# Patient Record
Sex: FEMALE | Race: BLACK OR AFRICAN AMERICAN | NOT HISPANIC OR LATINO | Employment: FULL TIME | URBAN - METROPOLITAN AREA
[De-identification: names, ages, dates, MRNs, and addresses within clinical notes are randomized per-mention and may not be internally consistent; named-entity substitution may affect disease eponyms.]

---

## 2017-02-09 ENCOUNTER — OFFICE VISIT (OUTPATIENT)
Dept: OBSTETRICS AND GYNECOLOGY | Facility: CLINIC | Age: 42
End: 2017-02-09
Payer: MEDICAID

## 2017-02-09 ENCOUNTER — LAB VISIT (OUTPATIENT)
Dept: LAB | Facility: HOSPITAL | Age: 42
End: 2017-02-09
Attending: OBSTETRICS & GYNECOLOGY
Payer: MEDICAID

## 2017-02-09 VITALS
WEIGHT: 147.06 LBS | BODY MASS INDEX: 23.64 KG/M2 | DIASTOLIC BLOOD PRESSURE: 70 MMHG | SYSTOLIC BLOOD PRESSURE: 110 MMHG | HEIGHT: 66 IN

## 2017-02-09 DIAGNOSIS — N92.6 IRREGULAR MENSES: Primary | ICD-10-CM

## 2017-02-09 DIAGNOSIS — N92.6 IRREGULAR MENSES: ICD-10-CM

## 2017-02-09 LAB
BASOPHILS # BLD AUTO: 0.03 K/UL
BASOPHILS NFR BLD: 0.4 %
DIFFERENTIAL METHOD: NORMAL
EOSINOPHIL # BLD AUTO: 0.1 K/UL
EOSINOPHIL NFR BLD: 1.2 %
ERYTHROCYTE [DISTWIDTH] IN BLOOD BY AUTOMATED COUNT: 12.9 %
HCT VFR BLD AUTO: 38.7 %
HGB BLD-MCNC: 12.5 G/DL
LYMPHOCYTES # BLD AUTO: 1.8 K/UL
LYMPHOCYTES NFR BLD: 25.7 %
MCH RBC QN AUTO: 28.9 PG
MCHC RBC AUTO-ENTMCNC: 32.3 %
MCV RBC AUTO: 89 FL
MONOCYTES # BLD AUTO: 0.4 K/UL
MONOCYTES NFR BLD: 6.1 %
NEUTROPHILS # BLD AUTO: 4.5 K/UL
NEUTROPHILS NFR BLD: 66.5 %
PLATELET # BLD AUTO: 301 K/UL
PMV BLD AUTO: 10.4 FL
RBC # BLD AUTO: 4.33 M/UL
TSH SERPL DL<=0.005 MIU/L-ACNC: 1.22 UIU/ML
WBC # BLD AUTO: 6.84 K/UL

## 2017-02-09 PROCEDURE — 84443 ASSAY THYROID STIM HORMONE: CPT

## 2017-02-09 PROCEDURE — 36415 COLL VENOUS BLD VENIPUNCTURE: CPT

## 2017-02-09 PROCEDURE — 99999 PR PBB SHADOW E&M-EST. PATIENT-LVL II: CPT | Mod: PBBFAC,,, | Performed by: OBSTETRICS & GYNECOLOGY

## 2017-02-09 PROCEDURE — 99204 OFFICE O/P NEW MOD 45 MIN: CPT | Mod: S$PBB,,, | Performed by: OBSTETRICS & GYNECOLOGY

## 2017-02-09 PROCEDURE — 85025 COMPLETE CBC W/AUTO DIFF WBC: CPT

## 2017-02-09 NOTE — PROGRESS NOTES
Subjective:       Patient ID: Monte Lashone Weathers is a 41 y.o. female.    Chief Complaint:  Menstrual Problem (irregular bleeding)      History of Present Illness  HPI  Pt complains of having 2 periods per month.  Pt has been having menses every 21-24 days and periods last 4-5 days.  Denies excessive bleeding or cramping with menses.  Periods have followed this pattern since menarche.  Pt is not really bothered by this, but was told it was not normal and wanted to have it evaluated.  Pt is not sexually active and is not using any contraceptive treatment.  Last pap NILM in 2016.    GYN & OB History  Patient's last menstrual period was 2017.   Date of Last Pap: No result found    OB History    Para Term  AB SAB TAB Ectopic Multiple Living   5 2   3   1  2      # Outcome Date GA Lbr Jose David/2nd Weight Sex Delivery Anes PTL Lv   5 AB            4 AB            3 Ectopic            2 Para            1 Para                   Review of Systems  Review of Systems   Constitutional: Negative for activity change, appetite change, fatigue, fever and unexpected weight change.   Respiratory: Negative for shortness of breath.    Cardiovascular: Negative for chest pain, palpitations and leg swelling.   Gastrointestinal: Negative for abdominal pain, constipation, diarrhea, nausea and vomiting.   Genitourinary: Negative for frequency, genital sores, hematuria, menorrhagia, menstrual problem, pelvic pain, vaginal bleeding, vaginal discharge, vaginal pain, dysmenorrhea and vaginal odor.   Musculoskeletal: Negative for back pain.   Neurological: Negative for syncope and headaches.           Objective:    Physical Exam:   Constitutional: She is oriented to person, place, and time. She appears well-developed and well-nourished. No distress.       Cardiovascular: Normal rate, regular rhythm and normal heart sounds.     Pulmonary/Chest: Effort normal and breath sounds normal.        Abdominal: Soft. Bowel sounds are  normal. She exhibits no distension. There is no tenderness.     Genitourinary: Vagina normal and uterus normal. Pelvic exam was performed with patient supine. There is no rash, tenderness, lesion or injury on the right labia. There is no rash, tenderness, lesion or injury on the left labia. Uterus is not deviated, not enlarged and not tender. Cervix is normal. Right adnexum displays no mass, no tenderness and no fullness. Left adnexum displays no mass, no tenderness and no fullness. No erythema, tenderness or bleeding in the vagina. No foreign body in the vagina. No signs of injury around the vagina. No vaginal discharge found. Cervix exhibits no motion tenderness, no discharge and no friability.           Musculoskeletal: Normal range of motion and moves all extremeties. She exhibits no edema or tenderness.       Neurological: She is alert and oriented to person, place, and time.    Skin: Skin is warm and dry.    Psychiatric: She has a normal mood and affect. Her behavior is normal. Thought content normal.          Assessment:        1. Irregular menses             Plan:      Irregular menses  -     CBC auto differential; Future; Expected date: 2/9/17  -     TSH; Future; Expected date: 2/9/17  -     Overall cycle patterns falls within normal range, however, periods are occurring every 21-24 days.  Pt was counseled on treatment options.  Pt voiced understanding and prefers to proceed with expectant management.      Return if symptoms worsen or fail to improve.

## 2017-02-10 ENCOUNTER — TELEPHONE (OUTPATIENT)
Dept: OBSTETRICS AND GYNECOLOGY | Facility: CLINIC | Age: 42
End: 2017-02-10

## 2017-02-10 NOTE — TELEPHONE ENCOUNTER
----- Message from Travis Rdz MD sent at 2/10/2017  1:14 PM CST -----  Please contact pt to inform that test results are within normal range.  Keep scheduled appt.

## 2017-02-16 ENCOUNTER — TELEPHONE (OUTPATIENT)
Dept: OBSTETRICS AND GYNECOLOGY | Facility: CLINIC | Age: 42
End: 2017-02-16

## 2017-02-16 NOTE — TELEPHONE ENCOUNTER
----- Message from Keith Reed sent at 2/16/2017 12:52 PM CST -----  Contact: Pt   States she is calling rg missing a phone call and is calling to see if it were 's nurse that called and can be reached at [phone]//[email or web address]/dbw

## 2017-02-17 NOTE — TELEPHONE ENCOUNTER
----- Message from Velma Javed sent at 2/17/2017  1:29 PM CST -----  Patient returning nurse call. Please adv/call [phone].//thanks. cw

## 2017-02-21 ENCOUNTER — TELEPHONE (OUTPATIENT)
Dept: OBSTETRICS AND GYNECOLOGY | Facility: CLINIC | Age: 42
End: 2017-02-21

## 2017-02-21 NOTE — TELEPHONE ENCOUNTER
Patient given normal test results and encouraged to keep next appointment.  Patient verbalized understanding.

## 2017-03-29 ENCOUNTER — OFFICE VISIT (OUTPATIENT)
Dept: OBSTETRICS AND GYNECOLOGY | Facility: CLINIC | Age: 42
End: 2017-03-29
Payer: MEDICAID

## 2017-03-29 VITALS
HEIGHT: 66 IN | RESPIRATION RATE: 15 BRPM | BODY MASS INDEX: 23.13 KG/M2 | WEIGHT: 143.94 LBS | SYSTOLIC BLOOD PRESSURE: 112 MMHG | DIASTOLIC BLOOD PRESSURE: 74 MMHG

## 2017-03-29 DIAGNOSIS — N63.25 BREAST LUMP ON LEFT SIDE AT 12 O'CLOCK POSITION: Primary | ICD-10-CM

## 2017-03-29 DIAGNOSIS — N64.4 BREAST PAIN, LEFT: ICD-10-CM

## 2017-03-29 PROCEDURE — 99213 OFFICE O/P EST LOW 20 MIN: CPT | Mod: PBBFAC | Performed by: OBSTETRICS & GYNECOLOGY

## 2017-03-29 PROCEDURE — 99214 OFFICE O/P EST MOD 30 MIN: CPT | Mod: S$PBB,,, | Performed by: OBSTETRICS & GYNECOLOGY

## 2017-03-29 PROCEDURE — 99999 PR PBB SHADOW E&M-EST. PATIENT-LVL III: CPT | Mod: PBBFAC,,, | Performed by: OBSTETRICS & GYNECOLOGY

## 2017-03-29 RX ORDER — PANTOPRAZOLE SODIUM 40 MG/1
40 TABLET, DELAYED RELEASE ORAL DAILY
COMMUNITY
End: 2017-06-05

## 2017-03-29 NOTE — MR AVS SNAPSHOT
"    [facility]  [address]  [place] [address]  Phone: [phone]  Fax: [phone]                  Monte Lashone Weathers   3/29/2017 3:15 PM   Office Visit    Description:  Female : 1975   Provider:  Lyudmila Galvez CNM   Department:  [facility]           Reason for Visit     Breast Pain           Diagnoses this Visit        Comments    Breast lump on left side at 12 o'clock position    -  Primary     Breast pain, left                To Do List           Future Appointments        Provider Department Dept Phone    2017 9:45 AM [facility] US2 [facility] [phone]    2017 10:30 AM [facility] MAMMO2-DX [facility] [phone]    2017 1:30 PM [facility]/ -[phone]      Goals (5 Years of Data)     None      [facility]     [facility] Nurse Care Line -  Assistance  Registered nurses in the [facility] provide clinical advisement, health education, appointment booking, and other advisory services.  Call for this free service at [phone].             Medications                Verify that the below list of medications is an accurate representation of the medications you are currently taking.  If none reported, the list may be blank. If incorrect, please contact your healthcare provider. Carry this list with you in case of emergency.           Current Medications     pantoprazole (PROTONIX) 40 MG tablet Take 40 mg by mouth once daily.           Clinical Reference Information           Your Vitals Were     BP Resp Height Weight Last Period BMI    112/74 (BP Location: Right arm, Patient Position: Sitting, BP Method: Manual) 15 5' 6" (1.676 m) 65.3 kg (143 lb 15.4 oz) 2017 23.24 kg/m2      Blood Pressure          Most Recent Value    BP  112/74      Allergies as of 3/29/2017     No Known Allergies      Immunizations Administered on Date of Encounter - 3/29/2017     None    "   Orders Placed During Today's Visit     Future Labs/Procedures Expected by Expires    Mammo Digital Diagnostic Bilateral With CAD  3/29/2017 5/29/2018    US Breast Left Complete  3/29/2017 5/29/2018      [facility] Sign-Up     Activating your [facility] account is as easy as 1-2-3!     1) Visit [email or web address], select Sign Up Now, enter this activation code and your date of birth, then select Next.  [email or web address]  Expires: 5/13/2017  3:39 PM      2) Create a username and password to use when you visit [facility] in the future and select a security question in case you lose your password and select Next.    3) Enter your e-mail address and click Sign Up!    Additional Information  If you have questions, please e-mail [email or web address] or call [phone] to talk to our [facility] staff. Remember, [facility] is NOT to be used for urgent needs. For medical emergencies, dial 911.         Language Assistance Services     ATTENTION: Language assistance services are available, free of charge. Please call [phone].      ATENCIÓN: Si habla español, tiene a rojas disposición servicios gratuitos de asistencia lingüística. Llame al [phone].     CHÚ Ý: N?u b?n nói Ti?ng Vi?t, có các d?ch v? h? tr? ngôn ng? mi?n phí dành cho b?n. G?i s? [phone].         [facility] complies with applicable Federal civil rights laws and does not discriminate on the basis of race, color, national origin, age, disability, or sex.

## 2017-03-30 NOTE — PROGRESS NOTES
Subjective:       Patient ID: Monte Lashone Weathers is a 41 y.o. female.    Chief Complaint:  Breast Pain (started in Dec 2016, stinging pain )    Patient's last menstrual period was 2017.     History of Present Illness  Patient here with complaints of left breast pain, stinging rated 8/10 starting around /January ever since buying new bras, still wearing new bras. Rec patient stop wearing bra that are causing her pain. Also reports palpable lump in left breast.    OB History    Para Term  AB SAB TAB Ectopic Multiple Living   5 2   3   1  2      # Outcome Date GA Lbr Jose David/2nd Weight Sex Delivery Anes PTL Lv   5 AB            4 AB            3 Ectopic            2 Para            1 Para                 Review of Systems  Review of Systems   Constitutional: Negative for chills and fever.   HENT: Negative for facial swelling.    Eyes: Negative for visual disturbance.   Respiratory: Negative for cough and shortness of breath.         Left breast pain and palpable mass   Cardiovascular: Negative for chest pain and palpitations.   Gastrointestinal: Negative for abdominal pain, diarrhea, nausea and vomiting.   Genitourinary: Negative for decreased urine volume, difficulty urinating, dysuria, frequency, vaginal bleeding, vaginal discharge and vaginal pain.   Neurological: Negative for dizziness, seizures, weakness and headaches.     Objective:    Physical Exam   Constitutional: She is oriented to person, place, and time. She appears well-developed and well-nourished. No distress. She is not intubated.   HENT:   Head: Normocephalic and atraumatic.   Neck: Normal range of motion. Neck supple. No tracheal deviation present. No thyromegaly present.   Pulmonary/Chest: Effort normal and breath sounds normal. No accessory muscle usage. No apnea, no tachypnea and no bradypnea. She is not intubated. No respiratory distress. Right breast exhibits no inverted nipple, no mass, no nipple discharge, no skin  change and no tenderness. Left breast exhibits mass. Left breast exhibits no inverted nipple, no nipple discharge, no skin change and no tenderness. Breasts are symmetrical. There is no breast swelling.       Abdominal: Soft.   Genitourinary: No breast tenderness, discharge or bleeding.   Musculoskeletal: Normal range of motion. She exhibits no edema, tenderness or deformity.   Neurological: She is alert and oriented to person, place, and time. She exhibits normal muscle tone. Coordination normal.   Skin: Skin is warm and dry. No rash noted. She is not diaphoretic. No erythema. No pallor.   Psychiatric: She has a normal mood and affect. Her behavior is normal. Judgment and thought content normal.   Nursing note and vitals reviewed.     Assessment:     1. Breast lump on left side at 12 o'clock position    2. Breast pain, left        Plan:   Monte Lashone was seen today for breast pain.    Diagnoses and all orders for this visit:    Breast lump on left side at 12 o'clock position  -     US Breast Left Complete; Future  -     Mammo Digital Diagnostic Bilateral With CAD; Future    Breast pain, left  -     US Breast Left Complete; Future  -     Mammo Digital Diagnostic Bilateral With CAD; Future    Follow up with Gen Surgery/Paty De La O NP after ultrasound and mammogram

## 2017-04-04 ENCOUNTER — TELEPHONE (OUTPATIENT)
Dept: RADIOLOGY | Facility: HOSPITAL | Age: 42
End: 2017-04-04

## 2017-04-05 ENCOUNTER — HOSPITAL ENCOUNTER (OUTPATIENT)
Dept: RADIOLOGY | Facility: HOSPITAL | Age: 42
Discharge: HOME | End: 2017-04-05
Attending: OBSTETRICS & GYNECOLOGY
Payer: MEDICAID

## 2017-04-05 ENCOUNTER — TELEPHONE (OUTPATIENT)
Dept: OBSTETRICS AND GYNECOLOGY | Facility: HOSPITAL | Age: 42
End: 2017-04-05

## 2017-04-05 DIAGNOSIS — N63.25 BREAST LUMP ON LEFT SIDE AT 12 O'CLOCK POSITION: ICD-10-CM

## 2017-04-05 DIAGNOSIS — N64.4 BREAST PAIN, LEFT: ICD-10-CM

## 2017-04-05 PROCEDURE — 76642 ULTRASOUND BREAST LIMITED: CPT | Mod: TC,PO,LT

## 2017-04-05 PROCEDURE — 77066 DX MAMMO INCL CAD BI: CPT | Mod: 26,,, | Performed by: RADIOLOGY

## 2017-04-05 PROCEDURE — 76642 ULTRASOUND BREAST LIMITED: CPT | Mod: 26,LT,, | Performed by: RADIOLOGY

## 2017-04-05 PROCEDURE — 77062 BREAST TOMOSYNTHESIS BI: CPT | Mod: TC

## 2017-04-05 PROCEDURE — 77062 BREAST TOMOSYNTHESIS BI: CPT | Mod: 26,,, | Performed by: RADIOLOGY

## 2017-04-05 NOTE — TELEPHONE ENCOUNTER
Spoke with patient regarding breast ultrasound and mammogram results. Patient v/u of follow up appt with Gen Surg on 4/10/17 @ 8 am

## 2017-04-06 ENCOUNTER — DOCUMENTATION ONLY (OUTPATIENT)
Dept: RADIOLOGY | Facility: HOSPITAL | Age: 42
End: 2017-04-06

## 2017-04-06 NOTE — PROGRESS NOTES
Breast Care Management Follow-Up:    Date of Mammogram:04/05/17    Mammogram Reason:Left breast mass    Mammogram Results:and Left U/S - Finding 1 - solid mass in the left breast in area of reported palpable abnormality. Finding 2 - solid mass in the left breast at 7:00. Finding 3 - suspicious calcifications in the left breast. Cat 4.      Referrals/Recommendations:Surgery consult        Patient Status:04/06/17 Results and rec given to pt by Lyudmila Galvez CNM. Pt has an appt with Paty De La O NP on 4/10/17. Results letter and report mailed to pt.

## 2017-04-07 ENCOUNTER — TELEPHONE (OUTPATIENT)
Dept: HEMATOLOGY/ONCOLOGY | Facility: CLINIC | Age: 42
End: 2017-04-07

## 2017-04-07 NOTE — TELEPHONE ENCOUNTER
Nurse returned pt called she was confused about her visit on Monday with NP, nurse explained to pt she will have a full assessment by Andrew De La O NP and she will inform her of the location for her breast bx, and instruction and pre-op will be reviewed at visit, pt was pleased with call and information provided to her, no other questions or concerns at time call ended.

## 2017-04-07 NOTE — TELEPHONE ENCOUNTER
----- Message from Marty Slade sent at 4/7/2017  8:49 AM CDT -----  Contact: Patient  Please call pt back @ ..[phone] (home). She has questions concerning her biopsy procedure. Thank you/NH

## 2017-04-11 ENCOUNTER — OFFICE VISIT (OUTPATIENT)
Dept: HEMATOLOGY/ONCOLOGY | Facility: CLINIC | Age: 42
End: 2017-04-11
Payer: MEDICAID

## 2017-04-11 VITALS
DIASTOLIC BLOOD PRESSURE: 70 MMHG | HEART RATE: 65 BPM | TEMPERATURE: 98 F | HEIGHT: 66 IN | WEIGHT: 144.81 LBS | SYSTOLIC BLOOD PRESSURE: 120 MMHG | RESPIRATION RATE: 18 BRPM | BODY MASS INDEX: 23.27 KG/M2 | OXYGEN SATURATION: 99 %

## 2017-04-11 DIAGNOSIS — R92.1 BREAST CALCIFICATIONS ON MAMMOGRAM: ICD-10-CM

## 2017-04-11 DIAGNOSIS — R92.8 ABNORMAL MAMMOGRAM: Primary | ICD-10-CM

## 2017-04-11 PROCEDURE — 99999 PR PBB SHADOW E&M-EST. PATIENT-LVL III: CPT | Mod: PBBFAC,,, | Performed by: NURSE PRACTITIONER

## 2017-04-11 PROCEDURE — 99213 OFFICE O/P EST LOW 20 MIN: CPT | Mod: PBBFAC,PO | Performed by: NURSE PRACTITIONER

## 2017-04-11 PROCEDURE — 99204 OFFICE O/P NEW MOD 45 MIN: CPT | Mod: S$PBB,,, | Performed by: NURSE PRACTITIONER

## 2017-04-11 NOTE — PATIENT INSTRUCTIONS
No anti-inflammatory medications 7 days prior to biopsy: Aleve, Aspirin, Ibuprofen and prescription anti-inflammatory medications.    May eat breakfast and take medications on day of procedure.    After procedure use ice pack over site intermittently and a good support bra for 24 hours.    No vigorous activity for 48 hours after biopsy that would cause extreme breast movement.     Return to clinic in one week for pathology results.

## 2017-04-11 NOTE — PROGRESS NOTES
Patient ID: Monte Lashone Weathers is a 41 y.o. female.    Chief Complaint: Abnormal Mammogram    HPI: Patient presents for the evaluation of a palpable left breast mass and abnormal mammogram of the left breast.    Review of Systems   Constitutional: Negative.    HENT: Positive for congestion and postnasal drip.    Eyes: Negative.    Respiratory: Negative.    Cardiovascular: Negative.    Gastrointestinal: Negative.    Endocrine: Negative.    Genitourinary: Negative.    Musculoskeletal: Negative.    Skin: Negative.    Allergic/Immunologic: Negative.    Neurological: Negative.    Hematological: Negative.    Psychiatric/Behavioral: Negative.    Breast: Pt relates having a sharp pain sensation that was intermittent about 2 months ago after she started wearing different bras. 2 weeks ago noted a small nodule in the left breast at the 12 oclock position. Denies any change in the area since noting. Pain sensation has decreased with a change in her bra. Nodule not tender to touch. No nipple discharge. No prior breast problems or trauma.     Current Outpatient Prescriptions   Medication Sig Dispense Refill    pantoprazole (PROTONIX) 40 MG tablet Take 40 mg by mouth once daily.       No current facility-administered medications for this visit.        Review of patient's allergies indicates:  No Known Allergies    No past medical history on file.    Past Surgical History:   Procedure Laterality Date    etopic pregnancy         Family History   Problem Relation Age of Onset    Stroke Father     Heart disease Mother        Social History     Social History    Marital status: Single     Spouse name: N/A    Number of children: N/A    Years of education: N/A     Occupational History    Not on file.     Social History Main Topics    Smoking status: Never Smoker    Smokeless tobacco: Never Used    Alcohol use No    Drug use: No    Sexual activity: No     Other Topics Concern    Not on file     Social History Narrative        There were no vitals filed for this visit.    Physical Exam   Constitutional: She is oriented to person, place, and time. She appears well-developed and well-nourished.   HENT:   Head: Normocephalic and atraumatic.   Right Ear: External ear normal.   Left Ear: External ear normal.   Eyes: Conjunctivae and EOM are normal. Pupils are equal, round, and reactive to light. Right eye exhibits no discharge. Left eye exhibits no discharge. No scleral icterus.   Neck: Normal range of motion. Neck supple. No thyromegaly present.   Cardiovascular: Normal rate and regular rhythm.    Pulmonary/Chest: Effort normal and breath sounds normal. Right breast exhibits no inverted nipple, no mass, no nipple discharge, no skin change and no tenderness. Left breast exhibits mass. Left breast exhibits no inverted nipple, no nipple discharge, no skin change and no tenderness.       Unable to appreciate the solid nodule at the 7 oclock position on exam.   Musculoskeletal: Normal range of motion.   Lymphadenopathy:        Head (right side): No submental, no submandibular, no tonsillar, no preauricular, no posterior auricular and no occipital adenopathy present.        Head (left side): No submental, no submandibular, no tonsillar, no preauricular, no posterior auricular and no occipital adenopathy present.     She has no cervical adenopathy.        Right cervical: No superficial cervical and no posterior cervical adenopathy present.       Left cervical: No superficial cervical and no posterior cervical adenopathy present.     She has no axillary adenopathy.        Right: No supraclavicular adenopathy present.        Left: No supraclavicular adenopathy present.   Neurological: She is alert and oriented to person, place, and time.   Skin: Skin is warm.   Psychiatric: She has a normal mood and affect. Her behavior is normal. Judgment and thought content normal.   Vitals reviewed.    Menarche at 16 y/o  [id number] and Ab2  First full  term pregnancy at 16 y/o  No hormone use  No radiation exposure  LMP: 4/5/17    FH: negative for breast cancer    IMAGING:  MAMMO DIGITAL DIAGNOSTIC BILAT WITH TOMOSYNTHESIS_CAD  Views: bilateral mediolateral oblique with tomosynthesis; bilateral  craniocaudal with tomosynthesis; left craniocaudal magnification; left  craniocaudal spot compression; left mediolateral magnification; left  mediolateral with tomosynthesis; left mediolateral oblique spot  compression; left mediolateral oblique magnification; left mediolateral  spot compression    The breasts are heterogeneously dense.  This may lower the sensitivity of  mammography.    Finding 1:  There is a round mass measuring 6 millimeters with  circumscribed margins seen in the left breast at 12 o'clock, sub-areolar.  Findings corresponds to a clinically reported palpable lump.    Finding 2:  There is an oval mass measuring 9 millimeters with  circumscribed margins seen in the anterior region of the left breast at 7  o'clock.    Finding 3:  There are punctate calcifications with grouped distribution  seen in the upper outer quadrant of the left breast.  Digital tomosynthesis was performed and used in the interpretation of the  images.  Images were evaluated with a Computer Aided Detection (CAD) system.    LEFT LIMITED ULTRASOUND FINDINGS:  Finding 1:  On ultrasound, the area in question is hypoechoic with  posterior acoustic enhancement and measures 8 x 4 x 5 mm. Ultrasound is  suggestive of a solid mass.    Finding 2:  On ultrasound, the area in question is hypoechoic with  posterior acoustic enhancement and measures 9 x 4 x 8 mm. Ultrasound is  suggestive of a solid mass.  Impression  Finding 1:  Solid mass in the left breast at 12 o'clock, sub-areolar is  probably benign. Follow-up in 6 months is recommended.    Finding 2:  Solid mass in the anterior region of the left breast at 7  o'clock is probably benign. Follow-up in 6 months is recommended.    Finding 3:   Calcifications in the left breast are suspicious. Histology  using core biopsy is recommended.      Assessment & Plan:  1. Palpable mass left breast 12 oclock  2. Solid nodule corresponds to the palpable mass at the 12 oclock left breast and another solid nodule was noted at the 7 oclock position left breast.  3. Microcalcifications noted in the left breast upper outer quadrant that appear suspicious.   4. Clinical exam verified presence of palpable nodule 12 oclock left breast- unable to palpate the 7 oclock nodule noted on ultrasound.  5. Explained the clinical and imaging findings to the pt and the significance.   6. Recommend stereotactic core biopsy of the left breast calcificaitons and either core biopsy of the 2 solid nodules if the radiologist recommends vs 6 mon f/u of the solid nodules.   7. Risk of the core biopsies reviewed with pt- risk of bleeding, infection, bruising, pain and the need for further surgical intervention if the path returns suspicious or cancer. Pt verbalized understanding and agrees with the recommendations.   8. Pre, claudia and postop instructions were give verbally and in writing.

## 2017-04-21 ENCOUNTER — DOCUMENTATION ONLY (OUTPATIENT)
Dept: SURGERY | Facility: CLINIC | Age: 42
End: 2017-04-21

## 2017-04-21 NOTE — PROGRESS NOTES
Notified pt of core biopsy results from [facility] performed on 4/18/17.   Left breast masses at 12 oclock and at 7 oclock and calcifications in the upper outer quadrant were biopsied.   Benign findings- see scanned report in media section under outside pathology.     Pt denies any bruising or redness at the biopsy sites. Tenderness noted. No fever.     Encouraged to do monthly bse and to call if notes any changes in the palpable nodule at the 12 oclock position. Would recommend we do a clinical exam at 3 mons to feliciano the palpable abnormality at 12 and imaging of the left breast- diagnostic mammo and ultrasound in 6 mons with clinical exam that follows. Pt verbalized understanding and agrees with recommendations.

## 2017-06-05 ENCOUNTER — OFFICE VISIT (OUTPATIENT)
Dept: OBSTETRICS AND GYNECOLOGY | Facility: CLINIC | Age: 42
End: 2017-06-05
Payer: MEDICAID

## 2017-06-05 VITALS
WEIGHT: 144.19 LBS | SYSTOLIC BLOOD PRESSURE: 112 MMHG | BODY MASS INDEX: 23.17 KG/M2 | DIASTOLIC BLOOD PRESSURE: 62 MMHG | HEIGHT: 66 IN

## 2017-06-05 DIAGNOSIS — N92.1 METRORRHAGIA: ICD-10-CM

## 2017-06-05 DIAGNOSIS — Z01.419 WELL WOMAN EXAM WITH ROUTINE GYNECOLOGICAL EXAM: Primary | ICD-10-CM

## 2017-06-05 PROCEDURE — 99999 PR PBB SHADOW E&M-EST. PATIENT-LVL III: CPT | Mod: PBBFAC,,, | Performed by: OBSTETRICS & GYNECOLOGY

## 2017-06-05 PROCEDURE — 99396 PREV VISIT EST AGE 40-64: CPT | Mod: S$PBB,,, | Performed by: OBSTETRICS & GYNECOLOGY

## 2017-06-05 PROCEDURE — 99213 OFFICE O/P EST LOW 20 MIN: CPT | Mod: PBBFAC | Performed by: OBSTETRICS & GYNECOLOGY

## 2017-06-05 PROCEDURE — 88175 CYTOPATH C/V AUTO FLUID REDO: CPT

## 2017-06-05 NOTE — PROGRESS NOTES
CHIEF COMPLAINT:   Gynecologic Exam  No chief complaint on file.      HISTORY OF PRESENT ILLNESS  Patient presents for annual exam. The patient has c/o irreg menses for past 6y. q 15-24d roughly as molly [place] but pt feels there is more irregularity than when she was younger.   She is not currenlty sexually active.  Contraception:  none       Patient's last menstrual period was 05/29/2017 (exact date).    GYN screening history: last Pap: was normal.      Health Maintenance   Topic Date Due    Lipid Panel  1975    TETANUS VACCINE  11/10/1994    Pap Smear with HPV Cotest  11/10/1997    Influenza Vaccine  08/01/2017    Mammogram  04/24/2019       HISTORY  There is no problem list on file for this patient.      History reviewed. No pertinent past medical history.    Past Surgical History:   Procedure Laterality Date    etopic pregnancy         Family History   Problem Relation Age of Onset    Stroke Father     Heart disease Mother        Social History     Social History    Marital status: Single     Spouse name: N/A    Number of children: N/A    Years of education: N/A     Social History Main Topics    Smoking status: Never Smoker    Smokeless tobacco: Never Used    Alcohol use No    Drug use: No    Sexual activity: No     Other Topics Concern    None     Social History Narrative    None       No current outpatient prescriptions on file.     No current facility-administered medications for this visit.        Review of patient's allergies indicates:  No Known Allergies        PHYSICAL EXAM     Vitals:    06/05/17 1351   BP: 112/62       PAIN SCALE: 0/10 None    ROS:  GENERAL: No fever, chills, fatigability or weight loss.  ABDOMEN: Appetite fine. No weight loss. Denies diarrhea, abdominal pain, hematemesis or blood in stool.  No change in bowel movement pattern.  URINARY: No flank pain, dysuria or hematuria.  REPRODUCTIVE: No abnormal vaginal bleeding.  BREASTS: Breasts symmetric,  nontender and no lumps detected.    PE:   APPEARANCE: Well nourished, well developed, in no acute distress.  NECK: Neck symmetric without masses or thyromegaly.     NODES: No inguinal lymph node enlargement.  ABDOMEN: Soft. No tenderness or masses. No hepatosplenomegaly. No hernias.  BREASTS: Symmetrical, no skin changes or visible lesions. No palpable masses, nipple discharge or adenopathy bilaterally.    PELVIC:   VULVA: No lesions. Normal female genitalia.  URETHRAL MEATUS: Normal size and location, no lesions, no prolapse.  URETHRA: No masses, tenderness, prolapse or scarring.  VAGINA: Moist and well rugated, no discharge, no significant cystocele or rectocele.  CERVIX: No lesions, normal diameter, no stenosis, no cervical motion tenderness.  UTERUS: 8 week size, regular shape, mobile, non-tender, normal position, good support.  ADNEXA: No masses, tenderness or CDS nodularity.  ANUS PERINEUM: No lesions, no relaxation, external hemorrhoids noted.        DIAGNOSIS:   1. Normal gyn exam  2. Screening pap smear  3. metrorrhagia    PLAN:   Mammogram   U/s pelvic    MEDICATIONS PRESCRIBED:        COUNSELING:  Patient was counseled today on A.C.S. Pap guidelines and recommendations for yearly pelvic exams, mammograms and monthly self breast exams; to see her PCP for other health maintenance.     FOLLOW-UP: With me for u/s review- possible EMB

## 2017-06-16 ENCOUNTER — TELEPHONE (OUTPATIENT)
Dept: RADIOLOGY | Facility: HOSPITAL | Age: 42
End: 2017-06-16

## 2017-06-19 ENCOUNTER — HOSPITAL ENCOUNTER (OUTPATIENT)
Dept: RADIOLOGY | Facility: HOSPITAL | Age: 42
Discharge: HOME | End: 2017-06-19
Attending: OBSTETRICS & GYNECOLOGY
Payer: MEDICAID

## 2017-06-19 ENCOUNTER — TELEPHONE (OUTPATIENT)
Dept: OBSTETRICS AND GYNECOLOGY | Facility: CLINIC | Age: 42
End: 2017-06-19

## 2017-06-19 DIAGNOSIS — N92.1 METRORRHAGIA: ICD-10-CM

## 2017-06-19 PROCEDURE — 76856 US EXAM PELVIC COMPLETE: CPT | Mod: 26,,, | Performed by: RADIOLOGY

## 2017-06-19 PROCEDURE — 76856 US EXAM PELVIC COMPLETE: CPT | Mod: TC

## 2017-06-19 PROCEDURE — 76830 TRANSVAGINAL US NON-OB: CPT | Mod: 26,,, | Performed by: RADIOLOGY

## 2017-06-19 NOTE — TELEPHONE ENCOUNTER
----- Message from Keyla Johns sent at 6/19/2017  1:27 PM CDT -----  Contact: pt  States she's on her cycle and she would like to see when the next available appt would be. Please call pt at [phone]. Thank you

## 2017-06-19 NOTE — TELEPHONE ENCOUNTER
Spoke with pt regarding ultrasound appointment that is for today. Pt stated that she was concerned about doing the ultrasound while she has been spotting. Pt was advised that it was fine to still make her appointment on today.

## 2017-12-22 ENCOUNTER — TELEPHONE (OUTPATIENT)
Dept: OBSTETRICS AND GYNECOLOGY | Facility: CLINIC | Age: 42
End: 2017-12-22

## 2017-12-22 NOTE — TELEPHONE ENCOUNTER
Spoke with pt and informed her that our first available would be 1/4/18. Pt declined the appointment and stated she'd go to an urgent care.

## 2017-12-22 NOTE — TELEPHONE ENCOUNTER
----- Message from Sangeetha Renner sent at 12/22/2017  9:37 AM CST -----  Patient is a established Medicaid patient.  She is having burning and itching in her vaginal area and wants to know if you could work her in today.   Call her at [phone].                                 landa

## 2018-04-02 ENCOUNTER — TELEPHONE (OUTPATIENT)
Dept: OBSTETRICS AND GYNECOLOGY | Facility: CLINIC | Age: 43
End: 2018-04-02

## 2018-04-02 NOTE — TELEPHONE ENCOUNTER
Spoke with pt. Scheduled her an appt per her request. Appt is scheduled for 4/5/18 at 9:00 am with Monalisa Torrez NP.

## 2018-08-08 ENCOUNTER — OFFICE VISIT (OUTPATIENT)
Dept: URGENT CARE | Facility: CLINIC | Age: 43
End: 2018-08-08
Payer: MEDICAID

## 2018-08-08 ENCOUNTER — HOSPITAL ENCOUNTER (OUTPATIENT)
Dept: RADIOLOGY | Facility: HOSPITAL | Age: 43
Discharge: HOME | End: 2018-08-08
Attending: NURSE PRACTITIONER
Payer: MEDICAID

## 2018-08-08 VITALS
HEIGHT: 67 IN | OXYGEN SATURATION: 100 % | SYSTOLIC BLOOD PRESSURE: 100 MMHG | HEART RATE: 87 BPM | BODY MASS INDEX: 22.15 KG/M2 | WEIGHT: 141.13 LBS | DIASTOLIC BLOOD PRESSURE: 60 MMHG | TEMPERATURE: 97 F | RESPIRATION RATE: 16 BRPM

## 2018-08-08 DIAGNOSIS — M79.89 NODULE OF SOFT TISSUE: ICD-10-CM

## 2018-08-08 DIAGNOSIS — M62.838 NECK MUSCLE SPASM: ICD-10-CM

## 2018-08-08 DIAGNOSIS — M25.512 ACUTE PAIN OF LEFT SHOULDER: ICD-10-CM

## 2018-08-08 DIAGNOSIS — M25.512 ACUTE PAIN OF LEFT SHOULDER: Primary | ICD-10-CM

## 2018-08-08 DIAGNOSIS — Z76.89 ENCOUNTER TO ESTABLISH CARE: ICD-10-CM

## 2018-08-08 DIAGNOSIS — D17.22 LIPOMA OF LEFT SHOULDER: ICD-10-CM

## 2018-08-08 DIAGNOSIS — H65.92 MIDDLE EAR EFFUSION, LEFT: ICD-10-CM

## 2018-08-08 PROCEDURE — 99999 PR PBB SHADOW E&M-EST. PATIENT-LVL V: CPT | Mod: PBBFAC,,, | Performed by: NURSE PRACTITIONER

## 2018-08-08 PROCEDURE — 73030 X-RAY EXAM OF SHOULDER: CPT | Mod: 26,LT,, | Performed by: RADIOLOGY

## 2018-08-08 PROCEDURE — 99215 OFFICE O/P EST HI 40 MIN: CPT | Mod: PBBFAC,25,PO | Performed by: NURSE PRACTITIONER

## 2018-08-08 PROCEDURE — 99203 OFFICE O/P NEW LOW 30 MIN: CPT | Mod: S$PBB,,, | Performed by: NURSE PRACTITIONER

## 2018-08-08 PROCEDURE — 73030 X-RAY EXAM OF SHOULDER: CPT | Mod: TC,FY,PO,LT

## 2018-08-08 RX ORDER — IBUPROFEN 800 MG/1
800 TABLET ORAL
COMMUNITY

## 2018-08-08 RX ORDER — CYCLOBENZAPRINE HCL 5 MG
5 TABLET ORAL 3 TIMES DAILY PRN
Qty: 30 TABLET | Refills: 0 | Status: SHIPPED | OUTPATIENT
Start: 2018-08-08 | End: 2018-08-18

## 2018-08-08 RX ORDER — FLUTICASONE PROPIONATE 50 MCG
2 SPRAY, SUSPENSION (ML) NASAL DAILY
Qty: 16 G | Refills: 0 | Status: SHIPPED | OUTPATIENT
Start: 2018-08-08 | End: 2018-11-14 | Stop reason: SDUPTHER

## 2018-08-08 RX ORDER — KETOROLAC TROMETHAMINE 30 MG/ML
30 INJECTION, SOLUTION INTRAMUSCULAR; INTRAVENOUS
Status: COMPLETED | OUTPATIENT
Start: 2018-08-08 | End: 2018-08-08

## 2018-08-08 RX ADMIN — KETOROLAC TROMETHAMINE 30 MG: 30 INJECTION, SOLUTION INTRAMUSCULAR; INTRAVENOUS at 01:08

## 2018-08-08 NOTE — PATIENT INSTRUCTIONS
Common Middle Ear Problems    Your middle ear may have been injured or infected recently. Over time, certain growths or bone disease can also harm the middle ear. Left untreated, middle ear problems often lead to lifelong hearing loss. There are two types of hearing loss: conductive and sensorineural. One or both kinds can occur. Injury, infection, certain growths, or bone disease can cause your symptoms. A ruptured eardrum or a long-lasting (chronic) ear infection may be painful and decrease hearing.  Symptoms  · Hearing loss in one or both ears  · Fluid, often smelly, draining from the ear  · Pain, pressure, or discomfort in the ear  · Ringing in the ear  Conductive and sensorineural hearing loss  Sound waves may be disrupted before they reach the inner ear. If this happens, conductive hearing loss may occur. The ear canal can be blocked by wax, infection, a tumor, or a foreign object. The eardrum can be injured or infected. Abnormal bone growth, infection, or tumors in the middle ear can block sound waves.  Sound waves may not be processed correctly in the inner ear. If this happens, sensorineural hearing loss may occur. Permanent hearing loss is most commonly associated with sensorineural problems.  The tests and evaluations used to diagnose what type of hearing problem you have will depend on your symptoms.   Date Last Reviewed: 10/1/2016  © 8448-6815 [employer]. [address] [place] [address], [place] [address]. All rights reserved. This information is not intended as a substitute for professional medical care. Always follow your healthcare professional's instructions.        Lipoma, No Treatment  A lipoma is a local overgrowth of fatty tissue. It appears as a soft raised area, usually less than 2 inches across. It is a benign condition (not cancer).   Home care  General information regarding lipoma includes:  1. No special care is needed for a lipoma.  2. You can consider removal for cosmetic  reasons.  3. Sometimes lipomas are uncomfortable because they put pressure on surrounding tissues. This is also a reason to have a lipoma removed.  Follow-up care  Follow up with your healthcare provider, or as advised if you want to have the lipoma removed at a later time.  When to seek medical advice  Call your healthcare provider right away if any of the following occur:  · Redness, pain, tenderness, or drainage from the lipoma  · Lipoma begins to enlarge or change shape  · Changes in the color of the skin over the lipoma  Date Last Reviewed: 6/1/2016  © 0617-4391 [employer]. [address] [place] [address]. All rights reserved. This information is not intended as a substitute for professional medical care. Always follow your healthcare professional's instructions.        The Shoulder Joint    The shoulder is made up of bones, muscles, ligaments, and tendons. They work together so you can reach, swing, and lift in comfort. Learning about the parts of the shoulder joint will help you to understand your shoulder problem.  The parts of the joint  The shoulder joint is where the humerus (upper arm bone) meets the scapula (shoulder blade):  · Muscles and ligaments help make up the joint. They attach to the shoulder blade and upper arm bone.  · At the top of the shoulder blade are two bony knobs called the acromion and coracoid process.  · The subacromial space is between the top of the humerus and the acromion. This space is filled with tendons, muscles, and the subacromial bursa.  · The bursa is a sac of fluid that cushions shoulder parts as they move.  The supraspinatus muscle and tendon are located in the subacromial space. They help form the rotator cuff and are commonly injured in a rotator cuff tear.  Date Last Reviewed: 10/12/2015  © 1920-5395 [employer]. [address] [place] [address]. All rights reserved. This information is not intended as a substitute for  professional medical care. Always follow your healthcare professional's instructions.

## 2018-08-08 NOTE — PROGRESS NOTES
Administered Toradol 30 mg IM to pt left ventrogluteal. Pt tolerated well. No distress noted. Advised pt to wait 20 minutes in lobby and to inform  if any adverse reaction occurs. Pt stated understanding.

## 2018-08-08 NOTE — PROGRESS NOTES
"Subjective:      Patient ID: Monte Lashone Weathers is a 42 y.o. female.    Chief Complaint: Shoulder Pain ("knot" on shoulder )    Ms. Davidson presents to Urgent Care today for a few different issues as follows:  1. A knot on the shoulder: this has been present for at least 4 years. Recently started causing her pain (1-2 weeks, worse in the last few days) and the knot seems to be increasing in size. No redness. No fever or chills. No swelling of the shoulder joint. There is aching deep in the shoulder that is worse with movement. Occasional tingling to the left hand. No numbness or weakness. She has tried ibuprofen 800 mg with no improvement.   2. A knot on the left upper lip: this has been present for >10 years. Occasional tingling to the upper lip. No change in size. No redness or tenderness. She'd like to discuss treatment options or possible removal.  3. Left ear pain: pain is aching, intermittent, and associated with occasional popping and muffled hearing. No other sinus complaints reported. No otorrhea. No treatments PTA.   4. Lateral neck pain: pain has been present for a few weeks off and on. Feels a tightness and soreness to both sides of the neck extending from the base of her head to her shoulders. No trauma or heavy lifting. No posterior/midline/bony tenderness. No history of neck problems. No treatment PTA aside from ibuprofen which has not provided any relief.  5. She would like to establish care with a PCP at [facility]. She sees [facility]. Called to get a PCP but there was no appointment availability until the middle of next year.      Shoulder Pain    This is a chronic problem. Episode onset: present for 4 years, but worse in the last few days. There has been no history of extremity trauma. Associated symptoms include tingling (occasional left hand tingling). Pertinent negatives include no limited range of motion or numbness. The symptoms are aggravated by activity and contact. She has tried NSAIDS " "for the symptoms. The treatment provided no relief. There is no history of diabetes or Injuries to Extremity.     Review of Systems   Constitutional: Negative.    HENT: Positive for ear pain. Negative for congestion, ear discharge, hearing loss, sinus pressure and sore throat.    Eyes: Negative.    Respiratory: Negative.    Cardiovascular: Negative.  Negative for chest pain, palpitations and leg swelling.   Gastrointestinal: Negative.    Genitourinary: Negative.    Musculoskeletal: Positive for arthralgias (left shoulder).   Neurological: Positive for tingling (occasional left hand tingling). Negative for numbness.   Hematological: Negative.        Objective:   /60 (BP Location: Right arm, Patient Position: Sitting, BP Method: Medium (Automatic))   Pulse 87   Temp 97.4 °F (36.3 °C) (Tympanic)   Resp 16   Ht 5' 7" (1.702 m)   Wt 64 kg (141 lb 1.5 oz)   LMP 07/26/2018 (Approximate)   SpO2 100%   BMI 22.10 kg/m²   Physical Exam   Constitutional: She is oriented to person, place, and time. She appears well-developed and well-nourished. No distress.   HENT:   Mouth/Throat:       Neck: Normal range of motion. Neck supple.   Cardiovascular: Normal rate.    Pulmonary/Chest: Effort normal. No respiratory distress.   Musculoskeletal:        Arms:  Neurological: She is alert and oriented to person, place, and time.   Skin: Skin is warm and dry. She is not diaphoretic.   Nursing note and vitals reviewed.    Assessment:      1. Acute pain of left shoulder    2. Lipoma of left shoulder    3. Nodule of soft tissue    4. Encounter to establish care    5. Middle ear effusion, left    6. Neck muscle spasm       Plan:   Acute pain of left shoulder  Comments:  Rest, gentle range of motion activities, ice. Toradol injection today. Resume ibuprofen in 24 hrs. Will follow up with results of x-ray when available.  Orders:  -     X-Ray Shoulder 2 or More Views Left; Future; Expected date: 08/08/2018  -     ketorolac injection " 30 mg; Inject 1 mL (30 mg total) into the muscle one time.    Lipoma of left shoulder  Comments:  With recent onset of pain and increased size, will refer to general surgery to discuss treatment options.  Orders:  -     Ambulatory referral to General Surgery    Nodule of soft tissue  Comments:  Left upper lip. Present > 10 years, would like to discuss treatment options. Refer to derm. External referral due to derm appointment wait times.   Orders:  -     Ambulatory referral to Dermatology    Encounter to establish care  -     Ambulatory referral to Internal Medicine    Middle ear effusion, left  -     fluticasone (FLONASE) 50 mcg/actuation nasal spray; 2 sprays (100 mcg total) by Each Nare route once daily.  Dispense: 16 g; Refill: 0    Neck muscle spasm  -     cyclobenzaprine (FLEXERIL) 5 MG tablet; Take 1 tablet (5 mg total) by mouth 3 (three) times daily as needed.  Dispense: 30 tablet; Refill: 0    Instructions, follow up, and supportive care as per AVS.  Follow up with PCP if not improved or for any new or worsening symptoms.

## 2018-08-20 ENCOUNTER — OFFICE VISIT (OUTPATIENT)
Dept: URGENT CARE | Facility: CLINIC | Age: 43
End: 2018-08-20
Payer: MEDICAID

## 2018-08-20 VITALS
BODY MASS INDEX: 21.71 KG/M2 | WEIGHT: 138.31 LBS | HEIGHT: 67 IN | RESPIRATION RATE: 16 BRPM | SYSTOLIC BLOOD PRESSURE: 112 MMHG | OXYGEN SATURATION: 98 % | HEART RATE: 45 BPM | TEMPERATURE: 99 F | DIASTOLIC BLOOD PRESSURE: 72 MMHG

## 2018-08-20 DIAGNOSIS — R11.0 NAUSEA: Primary | ICD-10-CM

## 2018-08-20 PROCEDURE — 99214 OFFICE O/P EST MOD 30 MIN: CPT | Mod: S$PBB,,, | Performed by: NURSE PRACTITIONER

## 2018-08-20 PROCEDURE — 99999 PR PBB SHADOW E&M-EST. PATIENT-LVL III: CPT | Mod: PBBFAC,,, | Performed by: NURSE PRACTITIONER

## 2018-08-20 PROCEDURE — 99213 OFFICE O/P EST LOW 20 MIN: CPT | Mod: PBBFAC,PO | Performed by: NURSE PRACTITIONER

## 2018-08-20 RX ORDER — ONDANSETRON 4 MG/1
4 TABLET, ORALLY DISINTEGRATING ORAL EVERY 6 HOURS PRN
Qty: 60 TABLET | Refills: 0 | Status: SHIPPED | OUTPATIENT
Start: 2018-08-20

## 2018-08-20 NOTE — PROGRESS NOTES
Subjective:       Patient ID: Monte Lashone Weathers is a 42 y.o. female.    Chief Complaint: Nausea    Nausea   This is a new problem. The current episode started today (after she ate half a veggie burger). The problem occurs rarely. The problem has been resolved. Associated symptoms include nausea and weakness. Pertinent negatives include no abdominal pain, anorexia, arthralgias, chest pain, congestion, coughing, fatigue, fever, numbness, rash, sore throat, visual change or vomiting. Nothing aggravates the symptoms. She has tried nothing for the symptoms.     Review of Systems   Constitutional: Negative for fatigue and fever.   HENT: Negative for congestion and sore throat.    Respiratory: Negative for cough, shortness of breath, wheezing and stridor.    Cardiovascular: Negative for chest pain, palpitations and leg swelling.   Gastrointestinal: Positive for nausea. Negative for abdominal pain, anorexia, blood in stool, constipation, diarrhea and vomiting.   Genitourinary: Negative for difficulty urinating.   Musculoskeletal: Negative for arthralgias.   Skin: Negative for color change and rash.   Allergic/Immunologic: Negative for environmental allergies.   Neurological: Positive for weakness. Negative for dizziness, light-headedness and numbness.   Psychiatric/Behavioral: Negative for agitation.       Objective:      Physical Exam   Constitutional: She is oriented to person, place, and time. She appears well-developed and well-nourished.   Cardiovascular: Normal rate, normal heart sounds and normal pulses. An irregularly irregular rhythm present.   Pulmonary/Chest: Effort normal and breath sounds normal.   Abdominal: Soft. Normal appearance. Bowel sounds are increased. There is no tenderness.   Neurological: She is alert and oriented to person, place, and time.   Skin: Skin is warm.   Psychiatric: She has a normal mood and affect.   Vitals reviewed.      Assessment:       1. Nausea        Plan:         Alex  Lashone was seen today for nausea.    Diagnoses and all orders for this visit:    Nausea  -     ondansetron (ZOFRAN-ODT) 4 MG TbDL; Take 1 tablet (4 mg total) by mouth every 6 (six) hours as needed.    Follow prescribed treatment plan as directed.  Stay hydrated and rest.  Report to ER if symptoms worsen.  Follow up with PCP in 2-3 days or sooner if symptoms do not improve.

## 2018-08-20 NOTE — PATIENT INSTRUCTIONS
Diet for Vomiting or Diarrhea (Adult)    Your symptoms may return or get worse after eating certain foods listed below. If this happens, stop eating these foods until your symptoms ease and you feel better.  Once the vomiting stops, follow the steps below.   During the first 12 to 24 hours  During the first 12 to 24 hours, follow this diet:  · Drinks. Plain water, sport drinks like electrolyte solutions, soft drinks without caffeine, mineral water (plain or flavored), clear fruit juices, and decaffeinated tea and coffee.  · Soups. Clear broth.  · Desserts. Plain gelatin, popsicles, and fruit juice bars. As you feel better, you may add 6 to 8 ounces of yogurt per day. If you have diarrhea, don't have foods or drinks that contain sugar, high-fructose corn syrup, or sugar alcohols.  During the next 24 hours  During the next 24 hours you may add the following to the above:  · Hot cereal, plain toast, bread, rolls, and crackers  · Plain noodles, rice, mashed potatoes, and chicken noodle or rice soup  · Unsweetened canned fruit (but not pineapple) and bananas  Don't eat more than 15 grams of fat a day. Do this by staying away from margarine, butter, oils, mayonnaise, sauces, gravies, fried foods, peanut butter, meat, poultry, and fish.  Don't eat much fiber. Stay away from raw or cooked vegetables, fresh fruits (except bananas), and bran cereals.  Limit how much caffeine and chocolate you have. Do not use any spices or seasonings except salt.  During the next 24 hours  Slowly go back to your normal diet, as you feel better and your symptoms ease.  Date Last Reviewed: 8/1/2016  © 1616-5288 [employer]. [address] [place] [address], [place] [address]. All rights reserved. This information is not intended as a substitute for professional medical care. Always follow your healthcare professional's instructions.

## 2018-08-29 ENCOUNTER — OFFICE VISIT (OUTPATIENT)
Dept: SURGERY | Facility: CLINIC | Age: 43
End: 2018-08-29
Payer: MEDICAID

## 2018-08-29 VITALS
HEART RATE: 47 BPM | SYSTOLIC BLOOD PRESSURE: 108 MMHG | HEIGHT: 67 IN | WEIGHT: 142.44 LBS | TEMPERATURE: 99 F | BODY MASS INDEX: 22.36 KG/M2 | DIASTOLIC BLOOD PRESSURE: 67 MMHG

## 2018-08-29 DIAGNOSIS — D23.30 CYST, DERMOID, FACE: ICD-10-CM

## 2018-08-29 DIAGNOSIS — D17.22 LIPOMA OF LEFT UPPER EXTREMITY: Primary | ICD-10-CM

## 2018-08-29 PROCEDURE — 99214 OFFICE O/P EST MOD 30 MIN: CPT | Mod: S$PBB,,, | Performed by: SURGERY

## 2018-08-29 PROCEDURE — 99999 PR PBB SHADOW E&M-EST. PATIENT-LVL III: CPT | Mod: PBBFAC,,, | Performed by: SURGERY

## 2018-08-29 PROCEDURE — 99213 OFFICE O/P EST LOW 20 MIN: CPT | Mod: PBBFAC,PO | Performed by: SURGERY

## 2018-08-29 RX ORDER — LIDOCAINE HYDROCHLORIDE 10 MG/ML
1 INJECTION, SOLUTION EPIDURAL; INFILTRATION; INTRACAUDAL; PERINEURAL ONCE
Status: CANCELLED | OUTPATIENT
Start: 2018-08-29 | End: 2018-08-29

## 2018-08-29 RX ORDER — SODIUM CHLORIDE 9 MG/ML
INJECTION, SOLUTION INTRAVENOUS CONTINUOUS
Status: CANCELLED | OUTPATIENT
Start: 2018-08-29

## 2018-08-29 NOTE — LETTER
August 29, 2018      Marysol Holt, KAYE  [address]  [place] [address]           [facility]  [address]  [place] [address]  Phone: [phone]  Fax: [phone]          Patient: Monte Lashone Weathers   MR Number: [id number]   YOB: 1975   Date of Visit: 8/29/2018       Dear Marysol Holt:    Thank you for referring Monte Lashone Weathers to me for evaluation. Attached you will find relevant portions of my assessment and plan of care.    If you have questions, please do not hesitate to call me. I look forward to following Monte Lashone Weathers along with you.    Sincerely,    Juan J Bridges MD    Enclosure  CC:  No Recipients    If you would like to receive this communication electronically, please contact [email or web address] or [phone] to request more information on [employer] access.    For providers and/or their staff who would like to refer a patient to [facility], please contact us through our one-stop-shop provider referral line, [facility] , at [phone].    If you feel you have received this communication in error or would no longer like to receive these types of communications, please e-mail [email or web address]

## 2018-08-30 ENCOUNTER — PATIENT MESSAGE (OUTPATIENT)
Dept: SURGERY | Facility: CLINIC | Age: 43
End: 2018-08-30

## 2018-08-30 NOTE — PROGRESS NOTES
History & Physical    SUBJECTIVE:     History of Present Illness:  Patient is a 42 y.o. female referred for left shoulder lipoma.  Patient reports mass on her left shoulder has been getting bigger in size.  It does not cause her much pain but she would like to have her removed.  She also noticed a cyst just above the left side of her lip and sometimes has some tingling/numbness.  She would like this removed as well.    Chief Complaint   Patient presents with    Consult       Review of patient's allergies indicates:  No Known Allergies    Current Outpatient Medications   Medication Sig Dispense Refill    fluticasone (FLONASE) 50 mcg/actuation nasal spray 2 sprays (100 mcg total) by Each Nare route once daily. 16 g 0    ibuprofen (ADVIL,MOTRIN) 800 MG tablet Take 800 mg by mouth.      ondansetron (ZOFRAN-ODT) 4 MG TbDL Take 1 tablet (4 mg total) by mouth every 6 (six) hours as needed. 60 tablet 0     No current facility-administered medications for this visit.        History reviewed. No pertinent past medical history.  Past Surgical History:   Procedure Laterality Date    etopic pregnancy       Family History   Problem Relation Age of Onset    Stroke Father     Heart disease Mother      Social History     Tobacco Use    Smoking status: Never Smoker    Smokeless tobacco: Never Used   Substance Use Topics    Alcohol use: No    Drug use: No        Review of Systems:  Review of Systems   Constitutional: Positive for unexpected weight change. Negative for activity change, chills, fatigue and fever.   HENT: Negative for hearing loss, rhinorrhea and trouble swallowing.    Eyes: Negative for discharge and visual disturbance.   Respiratory: Negative for cough, chest tightness, shortness of breath, wheezing and stridor.    Cardiovascular: Negative for chest pain, palpitations and leg swelling.   Gastrointestinal: Positive for constipation. Negative for abdominal distention, abdominal pain, blood in stool, diarrhea,  "nausea and vomiting.   Endocrine: Negative for polydipsia and polyuria.   Genitourinary: Negative for difficulty urinating, dysuria, frequency, hematuria, menstrual problem and urgency.   Musculoskeletal: Negative for arthralgias, joint swelling and neck pain.   Skin: Negative for color change, pallor, rash and wound.   Neurological: Positive for headaches. Negative for weakness.   Hematological: Does not bruise/bleed easily.   Psychiatric/Behavioral: Negative for confusion and dysphoric mood.       OBJECTIVE:     Vital Signs (Most Recent)  Temp: 98.6 °F (37 °C) (08/29/18 1242)  Pulse: (!) 47 (08/29/18 1242)  BP: 108/67 (08/29/18 1242)  5' 7" (1.702 m)  64.6 kg (142 lb 6.7 oz)     Physical Exam:  Physical Exam   Constitutional: She is oriented to person, place, and time. She appears well-developed and well-nourished. No distress.   HENT:   Head: Normocephalic and atraumatic.       Eyes: EOM are normal.   Neck: Neck supple.   Cardiovascular: Normal rate and regular rhythm.   Pulmonary/Chest: Effort normal and breath sounds normal.   Abdominal: Soft. Bowel sounds are normal. She exhibits no distension. There is no tenderness.   Neurological: She is alert and oriented to person, place, and time.   Skin: Skin is warm and dry.        Vitals reviewed.        ASSESSMENT/PLAN:     42-year-old female with facial cyst and left shoulder lipoma    PLAN:Plan     Removal in OR of left shoulder lipoma and facial cyst.  Dr. Romeo will perform facial cyst removal will coordinate surgical date  Preop:  CBC  Some benefits discussed with patient including:  Pain, bleeding, infection, need for further procedure         "

## 2018-09-05 ENCOUNTER — PATIENT MESSAGE (OUTPATIENT)
Dept: SURGERY | Facility: CLINIC | Age: 43
End: 2018-09-05

## 2018-09-11 ENCOUNTER — TELEPHONE (OUTPATIENT)
Dept: SURGERY | Facility: CLINIC | Age: 43
End: 2018-09-11

## 2018-09-11 NOTE — TELEPHONE ENCOUNTER
----- Message from Juan J Bridges MD sent at 9/5/2018  9:38 AM CDT -----  Regarding: RE: surgery  2:15. There should be a case request in already we just need to let the OR no to put her on that date and time.  Let me know if they do not have it any more and I will put in a new 1.  ----- Message -----  From: Susan Mooney MA  Sent: 9/5/2018   9:11 AM  To: Juan J Bridges MD  Subject: FW: surgery                                      Patient is fine with this date. Just would like to know a time.     ----- Message -----  From: Minna Spangler MA  Sent: 8/31/2018  10:20 AM  To: Susan Mooney MA  Subject: RE: surgery                                      Yes he can do the procedure on this day as well. Do I need to add an additional request or will you include his portion in your case request? And is the patient consented for this portion of the surgery as well?  ----- Message -----  From: Whitney Gutierres LPN  Sent: 8/30/2018   9:36 AM  To: Minna Spangler MA  Subject: FW: surgery                                          ----- Message -----  From: Susan Mooney MA  Sent: 8/30/2018   9:30 AM  To: Tahir Romeo MD, [facility] Staff  Subject: FW: surgery                                      Would 09/13/2018 be a good date for the procedure?    ----- Message -----  From: Juan J Bridges MD  Sent: 8/29/2018   7:47 PM  To: Susan Mooney MA  Subject: surgery                                          Can you see if patient would be able to undergo surgery to remove left shoulder lipoma and facial cyst on 9/13.  We will also have to make sure this will work with  as he will remove the facial cyst wall I am removing the lipoma.  I discussed patient with him and he is fine will just have to coordinate dates.  His portion the procedure will be very quickly and he can likely do in between his already scheduled cases while I have her in the OR.

## 2018-09-11 NOTE — TELEPHONE ENCOUNTER
Need additional case request put in for tomorrow's case. Also, patient will be consented morning of surgery for both procedures.

## 2018-09-11 NOTE — TELEPHONE ENCOUNTER
Patient is scheduled for procedures on 18 with Dr Bridges and Dr Romeo. Called patient to advise per Dr Bridges that Dr Romeo will be out on Thursday attending a . He will not be able to remove the cyst from patients face but Dr Bridges is okay with doing both procedures. If patient would like to wait for Dr Romeo the next available date for both surgeons together is 18. Left a message to return the call.

## 2018-09-12 ENCOUNTER — ANESTHESIA EVENT (OUTPATIENT)
Dept: SURGERY | Facility: HOSPITAL | Age: 43
End: 2018-09-12

## 2018-09-12 ENCOUNTER — LAB VISIT (OUTPATIENT)
Dept: LAB | Facility: HOSPITAL | Age: 43
End: 2018-09-12
Attending: SURGERY
Payer: MEDICAID

## 2018-09-12 DIAGNOSIS — Z01.818 PRE-OP TESTING: ICD-10-CM

## 2018-09-12 DIAGNOSIS — Z01.818 PRE-OP TESTING: Primary | ICD-10-CM

## 2018-09-12 LAB
BASOPHILS # BLD AUTO: 0.06 K/UL
BASOPHILS NFR BLD: 0.9 %
DIFFERENTIAL METHOD: ABNORMAL
EOSINOPHIL # BLD AUTO: 0.1 K/UL
EOSINOPHIL NFR BLD: 1.2 %
ERYTHROCYTE [DISTWIDTH] IN BLOOD BY AUTOMATED COUNT: 12.9 %
HCT VFR BLD AUTO: 35.9 %
HGB BLD-MCNC: 11.6 G/DL
LYMPHOCYTES # BLD AUTO: 1.9 K/UL
LYMPHOCYTES NFR BLD: 28.9 %
MCH RBC QN AUTO: 29.1 PG
MCHC RBC AUTO-ENTMCNC: 32.3 G/DL
MCV RBC AUTO: 90 FL
MONOCYTES # BLD AUTO: 0.4 K/UL
MONOCYTES NFR BLD: 6.8 %
NEUTROPHILS # BLD AUTO: 4.1 K/UL
NEUTROPHILS NFR BLD: 62.2 %
PLATELET # BLD AUTO: 280 K/UL
PMV BLD AUTO: 9.2 FL
RBC # BLD AUTO: 3.98 M/UL
WBC # BLD AUTO: 6.51 K/UL

## 2018-09-12 PROCEDURE — 85025 COMPLETE CBC W/AUTO DIFF WBC: CPT

## 2018-09-12 PROCEDURE — 36415 COLL VENOUS BLD VENIPUNCTURE: CPT

## 2018-09-13 ENCOUNTER — TELEPHONE (OUTPATIENT)
Dept: SURGERY | Facility: CLINIC | Age: 43
End: 2018-09-13

## 2018-09-13 ENCOUNTER — ANESTHESIA (OUTPATIENT)
Dept: SURGERY | Facility: HOSPITAL | Age: 43
End: 2018-09-13

## 2018-09-13 NOTE — TELEPHONE ENCOUNTER
Patient is scheduled for surgery this morning but had to take her kids to school and will not be able to make it. She wanted to know if we could move her time to 9a or 10a. Per Morenita in the O.R. patient will have to reschedule for another day. Patient notified that Dr Bridges's nurse will call her with another surgery date. Understanding verbalized.

## 2018-09-13 NOTE — TELEPHONE ENCOUNTER
----- Message from Noemi Rebolledo sent at 9/13/2018  8:00 AM CDT -----  Pt at [phone]//states she was suppose to have surgery this morning//not able to come in this morning//please call asap//sonny/leander

## 2018-09-14 ENCOUNTER — TELEPHONE (OUTPATIENT)
Dept: SURGERY | Facility: CLINIC | Age: 43
End: 2018-09-14

## 2018-09-14 NOTE — TELEPHONE ENCOUNTER
----- Message from Zuleyka Townsend sent at 9/14/2018  1:32 PM CDT -----  needs to r/s surgery...[phone]

## 2018-09-17 NOTE — TELEPHONE ENCOUNTER
She will need to know the times? She can't do for 7 in the morning she has to bring her kids to school in the mornings.

## 2018-09-18 ENCOUNTER — PATIENT MESSAGE (OUTPATIENT)
Dept: SURGERY | Facility: CLINIC | Age: 43
End: 2018-09-18

## 2018-09-27 ENCOUNTER — TELEPHONE (OUTPATIENT)
Dept: SURGERY | Facility: CLINIC | Age: 43
End: 2018-09-27

## 2018-09-27 NOTE — TELEPHONE ENCOUNTER
Regarding: FW:Reminder for Upcoming Procedure  Contact: [phone]      ----- Message -----  From: Monte Lashone Weathers  Sent: 9/27/2018   4:04 PM  To: Cyrus [facility]rita Staff  Subject: RE:Reminder for Upcoming Procedure               ----- Message from [facility] Message sent at 9/27/2018  4:04 PM CDT -----    Hi,  I have an surgery schedule for tomorrow on 9/28  No one has given me a schedule time to come in...   ----- Message -----  From: Juan J Bridges MD  Sent: 9/21/2018  8:30 AM CDT  To: Monte Lashone Weathers  Subject: Reminder for Upcoming Procedure  [facility] [address] [place] [id number]      09/21/2018      Dear Monte Lashone Weathers,      This is a reminder for your upcoming procedure with Juan J Bridges MD on 9/28/2018. We will contact you again before the day of your procedure with your scheduled arrival time.        If you have questions or scheduling concerns, you can contact your physicians office:   Juan J Bridges MD  Phone Number: [phone]      Sincerely,

## 2018-09-28 NOTE — ANESTHESIA PREPROCEDURE EVALUATION
09/27/2018  Monte Lashone Weathers is a 42 y.o., female.    Pre-op Assessment    I have reviewed the Patient Summary Reports.     I have reviewed the Nursing Notes.      Review of Systems  Social:  Non-Smoker, No Alcohol Use    Hematology/Oncology:  Hematology Normal   Oncology Normal     EENT/Dental:EENT/Dental Normal   Cardiovascular:  Cardiovascular Normal     Pulmonary:  Pulmonary Normal    Renal/:  Renal/ Normal     Hepatic/GI:  Hepatic/GI Normal    Musculoskeletal:  Musculoskeletal Normal    Neurological:  Neurology Normal    Endocrine:  Endocrine Normal    Dermatological:   Dermoid cyst face, lipoma upper extremity.    Psych:  Psychiatric Normal              Anesthesia Plan  Type of Anesthesia, risks & benefits discussed:  Anesthesia Type:  general  Patient's Preference:   Intra-op Monitoring Plan: standard ASA monitors  Intra-op Monitoring Plan Comments:   Post Op Pain Control Plan: IV/PO Opioids PRN  Post Op Pain Control Plan Comments:   Induction:   IV  Beta Blocker:         Informed Consent:    ASA Score: 2     Day of Surgery Review of History & Physical:

## 2018-10-29 ENCOUNTER — TELEPHONE (OUTPATIENT)
Dept: SURGERY | Facility: CLINIC | Age: 43
End: 2018-10-29

## 2018-10-29 DIAGNOSIS — D17.22 LIPOMA OF LEFT UPPER EXTREMITY: Primary | ICD-10-CM

## 2018-10-29 NOTE — TELEPHONE ENCOUNTER
----- Message from Khushbu Garciaite sent at 10/29/2018  4:31 PM CDT -----  Contact: Pt   Pt called and stated she needs to schedule her surgery date. She can be reached at [phone].    Thanks,  TF

## 2018-10-30 DIAGNOSIS — D17.22 LIPOMA OF LEFT SHOULDER: Primary | ICD-10-CM

## 2018-10-30 DIAGNOSIS — D23.30 CYST, DERMOID, FACE: ICD-10-CM

## 2018-10-30 RX ORDER — SODIUM CHLORIDE 9 MG/ML
INJECTION, SOLUTION INTRAVENOUS CONTINUOUS
Status: CANCELLED | OUTPATIENT
Start: 2018-10-30

## 2018-10-30 RX ORDER — LIDOCAINE HYDROCHLORIDE 10 MG/ML
1 INJECTION, SOLUTION EPIDURAL; INFILTRATION; INTRACAUDAL; PERINEURAL ONCE
Status: CANCELLED | OUTPATIENT
Start: 2018-10-30 | End: 2018-10-30

## 2018-10-31 ENCOUNTER — PATIENT MESSAGE (OUTPATIENT)
Dept: OBSTETRICS AND GYNECOLOGY | Facility: CLINIC | Age: 43
End: 2018-10-31

## 2018-11-07 ENCOUNTER — LAB VISIT (OUTPATIENT)
Dept: LAB | Facility: HOSPITAL | Age: 43
End: 2018-11-07
Attending: SURGERY
Payer: MEDICAID

## 2018-11-07 DIAGNOSIS — D17.22 LIPOMA OF LEFT UPPER EXTREMITY: ICD-10-CM

## 2018-11-07 LAB
BASOPHILS # BLD AUTO: 0.07 K/UL
BASOPHILS NFR BLD: 1.1 %
DIFFERENTIAL METHOD: ABNORMAL
EOSINOPHIL # BLD AUTO: 0.1 K/UL
EOSINOPHIL NFR BLD: 2.2 %
ERYTHROCYTE [DISTWIDTH] IN BLOOD BY AUTOMATED COUNT: 13 %
HCT VFR BLD AUTO: 36.3 %
HGB BLD-MCNC: 11.7 G/DL
IMM GRANULOCYTES # BLD AUTO: 0.02 K/UL
IMM GRANULOCYTES NFR BLD AUTO: 0.3 %
LYMPHOCYTES # BLD AUTO: 2 K/UL
LYMPHOCYTES NFR BLD: 31.9 %
MCH RBC QN AUTO: 29.3 PG
MCHC RBC AUTO-ENTMCNC: 32.2 G/DL
MCV RBC AUTO: 91 FL
MONOCYTES # BLD AUTO: 0.5 K/UL
MONOCYTES NFR BLD: 8.4 %
NEUTROPHILS # BLD AUTO: 3.6 K/UL
NEUTROPHILS NFR BLD: 56.1 %
NRBC BLD-RTO: 0 /100 WBC
PLATELET # BLD AUTO: 314 K/UL
PMV BLD AUTO: 10.1 FL
RBC # BLD AUTO: 3.99 M/UL
WBC # BLD AUTO: 6.34 K/UL

## 2018-11-07 PROCEDURE — 36415 COLL VENOUS BLD VENIPUNCTURE: CPT

## 2018-11-07 PROCEDURE — 85025 COMPLETE CBC W/AUTO DIFF WBC: CPT

## 2018-11-12 ENCOUNTER — ANESTHESIA EVENT (OUTPATIENT)
Dept: SURGERY | Facility: HOSPITAL | Age: 43
End: 2018-11-12
Payer: MEDICAID

## 2018-11-12 NOTE — PRE-PROCEDURE INSTRUCTIONS
Pre op instructions reviewed with patient per phone:    To confirm, Your surgeon has instructed you:  Surgery is scheduled 11/13/18 at 1330.  Pre admit office to call afternoon prior to surgery with final arrival time.  If surgery is on Monday, Pre admit office to call Friday afternoon with with final arrival time      Please report to [facility] 1st floor main lobby by 1200.      INSTRUCTIONS IMPORTANT!!!  ¨ No smoking after 12 midnight, the night before surgery.  ¨ No solid food after 12 midnight, but you may have clear liquids up until 3 hours prior to surgery.  This includes: grape, cranberry, and apple juice (not orange, and no coffee.)   ¨ OK to brush teeth, but no gum, candy or mints!    ¨ Take only these medicines with a small swallow of water-morning of surgery.  None  ____  Do not wear makeup, including mascara.  ____  No powder, lotions or creams to surgical area.  ____  Please remove all jewelry, including piercings and leave at home.  ____  No money or valuables needed. Please leave at home.  ____  Please bring identification and insurance information to hospital.  ____  If going home the same day, arrange for a ride home. You will not be able to   drive if Anesthesia was used.  ____  Children, under 12 years old, must remain in the waiting room with an adult.  They are not allowed in patient areas.  ____  Wear loose fitting clothing. Allow for dressings, bandages.  ____  Stop Aspirin, Ibuprofen, Motrin and Aleve at least 5-7 days before surgery, unless otherwise instructed by your doctor, or the nurse.   You MAY use Tylenol/acetaminophen until day of surgery.  ____  If you take diabetic medication, do not take am of surgery unless instructed by   Doctor.  ____ Stop taking any Fish Oil supplement or any Vitamins that contain Vitamin E at least 5 days prior to surgery.          Bathing Instructions-- The night before surgery and the morning prior to coming to the hospital:   -Do  not shave the surgical area.   -Shower and wash your hair and body as usual with your regular soap and shampoo.   -Rinse your hair and body completely.   -Use one packet of hibiclens to wash the surgical site (using your hand) gently for 5 minutes.  Do not scrub you skin too hard.   -Do not use hibiclens on your head, face, or genitals.   -Do not wash with regular soap after you use the hibiclens.   -Rinse your body thoroughly.   -Dry with clean, soft towel.  Do not use lotion, cream, deodorant, or powders on   the surgical site.    Use antibacterial soap in place of hibiclens if your surgery is on the head, face or genitals.         Surgical Site Infection    Prevention of surgical site infections:     -Keep incisions clean and dry.   -Do not soak/submerge incisions in water until completely healed.   -Do not apply lotions, powders, creams, or deodorants to site.   -Always make sure hands are cleaned with antibacterial soap/ alcohol-based   prior to touching the surgical site.  (This includes doctors, nurses, staff, and yourself.)    Signs and symptoms:   -Redness and pain around the area where you had surgery   -Drainage of cloudy fluid from your surgical wound   -Fever over 100.4  I have read or had read and explained to me, and understand the above information.

## 2018-11-13 ENCOUNTER — ANESTHESIA (OUTPATIENT)
Dept: SURGERY | Facility: HOSPITAL | Age: 43
End: 2018-11-13
Payer: MEDICAID

## 2018-11-13 ENCOUNTER — HOSPITAL ENCOUNTER (OUTPATIENT)
Facility: HOSPITAL | Age: 43
Discharge: HOME | End: 2018-11-13
Attending: SURGERY | Admitting: SURGERY
Payer: MEDICAID

## 2018-11-13 VITALS
RESPIRATION RATE: 10 BRPM | TEMPERATURE: 98 F | DIASTOLIC BLOOD PRESSURE: 72 MMHG | HEART RATE: 45 BPM | BODY MASS INDEX: 22.89 KG/M2 | SYSTOLIC BLOOD PRESSURE: 122 MMHG | WEIGHT: 142.44 LBS | HEIGHT: 66 IN | OXYGEN SATURATION: 100 %

## 2018-11-13 DIAGNOSIS — D17.22 LIPOMA OF LEFT SHOULDER: ICD-10-CM

## 2018-11-13 PROCEDURE — 11441 EXC FACE-MM B9+MARG 0.6-1 CM: CPT | Mod: 51,,, | Performed by: SURGERY

## 2018-11-13 PROCEDURE — 88341 IMHCHEM/IMCYTCHM EA ADD ANTB: CPT | Mod: 26,,, | Performed by: PATHOLOGY

## 2018-11-13 PROCEDURE — 63600175 PHARM REV CODE 636 W HCPCS: Performed by: ANESTHESIOLOGY

## 2018-11-13 PROCEDURE — 00300 ANES ALL PX INTEG H/N/PTRUNK: CPT | Performed by: SURGERY

## 2018-11-13 PROCEDURE — 36000706: Performed by: SURGERY

## 2018-11-13 PROCEDURE — 88304 TISSUE EXAM BY PATHOLOGIST: CPT | Mod: 26,,, | Performed by: PATHOLOGY

## 2018-11-13 PROCEDURE — S0020 INJECTION, BUPIVICAINE HYDRO: HCPCS | Performed by: SURGERY

## 2018-11-13 PROCEDURE — 25000003 PHARM REV CODE 250: Performed by: SURGERY

## 2018-11-13 PROCEDURE — 23071 EXC SHOULDER LES SC 3 CM/>: CPT | Mod: LT,,, | Performed by: SURGERY

## 2018-11-13 PROCEDURE — 63600175 PHARM REV CODE 636 W HCPCS: Performed by: NURSE ANESTHETIST, CERTIFIED REGISTERED

## 2018-11-13 PROCEDURE — 37000008 HC ANESTHESIA 1ST 15 MINUTES: Performed by: SURGERY

## 2018-11-13 PROCEDURE — 71000015 HC POSTOP RECOV 1ST HR: Performed by: SURGERY

## 2018-11-13 PROCEDURE — 88304 TISSUE EXAM BY PATHOLOGIST: CPT | Performed by: PATHOLOGY

## 2018-11-13 PROCEDURE — 37000009 HC ANESTHESIA EA ADD 15 MINS: Performed by: SURGERY

## 2018-11-13 PROCEDURE — 63600175 PHARM REV CODE 636 W HCPCS: Performed by: SURGERY

## 2018-11-13 PROCEDURE — 25000003 PHARM REV CODE 250: Performed by: NURSE ANESTHETIST, CERTIFIED REGISTERED

## 2018-11-13 PROCEDURE — 71000033 HC RECOVERY, INTIAL HOUR: Performed by: SURGERY

## 2018-11-13 PROCEDURE — 88342 IMHCHEM/IMCYTCHM 1ST ANTB: CPT | Mod: 26,,, | Performed by: PATHOLOGY

## 2018-11-13 PROCEDURE — 36000707: Performed by: SURGERY

## 2018-11-13 RX ORDER — SODIUM CHLORIDE, SODIUM LACTATE, POTASSIUM CHLORIDE, CALCIUM CHLORIDE 600; 310; 30; 20 MG/100ML; MG/100ML; MG/100ML; MG/100ML
INJECTION, SOLUTION INTRAVENOUS CONTINUOUS
Status: ACTIVE | OUTPATIENT
Start: 2018-11-13

## 2018-11-13 RX ORDER — LIDOCAINE HYDROCHLORIDE 10 MG/ML
1 INJECTION, SOLUTION EPIDURAL; INFILTRATION; INTRACAUDAL; PERINEURAL ONCE
Status: DISCONTINUED | OUTPATIENT
Start: 2018-11-13 | End: 2018-11-13 | Stop reason: HOSPADM

## 2018-11-13 RX ORDER — FENTANYL CITRATE 50 UG/ML
25 INJECTION, SOLUTION INTRAMUSCULAR; INTRAVENOUS EVERY 5 MIN PRN
Status: DISPENSED | OUTPATIENT
Start: 2018-11-13

## 2018-11-13 RX ORDER — HYDROCODONE BITARTRATE AND ACETAMINOPHEN 5; 325 MG/1; MG/1
1 TABLET ORAL
Status: ACTIVE | OUTPATIENT
Start: 2018-11-13

## 2018-11-13 RX ORDER — HYDROCODONE BITARTRATE AND ACETAMINOPHEN 5; 325 MG/1; MG/1
1 TABLET ORAL EVERY 4 HOURS PRN
Status: DISCONTINUED | OUTPATIENT
Start: 2018-11-13 | End: 2018-11-13 | Stop reason: HOSPADM

## 2018-11-13 RX ORDER — FENTANYL CITRATE 50 UG/ML
INJECTION, SOLUTION INTRAMUSCULAR; INTRAVENOUS
Status: DISCONTINUED | OUTPATIENT
Start: 2018-11-13 | End: 2018-11-13

## 2018-11-13 RX ORDER — SUCCINYLCHOLINE CHLORIDE 20 MG/ML
INJECTION INTRAMUSCULAR; INTRAVENOUS
Status: DISCONTINUED | OUTPATIENT
Start: 2018-11-13 | End: 2018-11-13

## 2018-11-13 RX ORDER — CEFAZOLIN SODIUM 2 G/50ML
2 SOLUTION INTRAVENOUS
Status: COMPLETED | OUTPATIENT
Start: 2018-11-13 | End: 2018-11-13

## 2018-11-13 RX ORDER — SODIUM CHLORIDE, SODIUM LACTATE, POTASSIUM CHLORIDE, CALCIUM CHLORIDE 600; 310; 30; 20 MG/100ML; MG/100ML; MG/100ML; MG/100ML
INJECTION, SOLUTION INTRAVENOUS CONTINUOUS PRN
Status: DISCONTINUED | OUTPATIENT
Start: 2018-11-13 | End: 2018-11-13

## 2018-11-13 RX ORDER — HYDROMORPHONE HYDROCHLORIDE 2 MG/ML
0.2 INJECTION, SOLUTION INTRAMUSCULAR; INTRAVENOUS; SUBCUTANEOUS EVERY 5 MIN PRN
Status: DISPENSED | OUTPATIENT
Start: 2018-11-13

## 2018-11-13 RX ORDER — HYDROCODONE BITARTRATE AND ACETAMINOPHEN 10; 325 MG/1; MG/1
1 TABLET ORAL EVERY 4 HOURS PRN
Status: DISCONTINUED | OUTPATIENT
Start: 2018-11-13 | End: 2018-11-13 | Stop reason: HOSPADM

## 2018-11-13 RX ORDER — SODIUM CHLORIDE 9 MG/ML
INJECTION, SOLUTION INTRAVENOUS CONTINUOUS
Status: DISCONTINUED | OUTPATIENT
Start: 2018-11-13 | End: 2018-11-13 | Stop reason: HOSPADM

## 2018-11-13 RX ORDER — ONDANSETRON 2 MG/ML
4 INJECTION INTRAMUSCULAR; INTRAVENOUS EVERY 12 HOURS PRN
Status: DISCONTINUED | OUTPATIENT
Start: 2018-11-13 | End: 2018-11-13 | Stop reason: HOSPADM

## 2018-11-13 RX ORDER — ONDANSETRON 2 MG/ML
4 INJECTION INTRAMUSCULAR; INTRAVENOUS DAILY PRN
Status: ACTIVE | OUTPATIENT
Start: 2018-11-13

## 2018-11-13 RX ORDER — MEPERIDINE HYDROCHLORIDE 50 MG/ML
12.5 INJECTION INTRAMUSCULAR; INTRAVENOUS; SUBCUTANEOUS ONCE AS NEEDED
Status: ACTIVE | OUTPATIENT
Start: 2018-11-13 | End: 2018-11-13

## 2018-11-13 RX ORDER — DIPHENHYDRAMINE HCL 25 MG
25 CAPSULE ORAL EVERY 6 HOURS PRN
Status: DISPENSED | OUTPATIENT
Start: 2018-11-13

## 2018-11-13 RX ORDER — LIDOCAINE HYDROCHLORIDE 10 MG/ML
INJECTION INFILTRATION; PERINEURAL
Status: DISCONTINUED | OUTPATIENT
Start: 2018-11-13 | End: 2018-11-13 | Stop reason: HOSPADM

## 2018-11-13 RX ORDER — HYDROCODONE BITARTRATE AND ACETAMINOPHEN 5; 325 MG/1; MG/1
1 TABLET ORAL EVERY 6 HOURS PRN
Qty: 10 TABLET | Refills: 0 | Status: SHIPPED | OUTPATIENT
Start: 2018-11-13

## 2018-11-13 RX ORDER — ROCURONIUM BROMIDE 10 MG/ML
INJECTION, SOLUTION INTRAVENOUS
Status: DISCONTINUED | OUTPATIENT
Start: 2018-11-13 | End: 2018-11-13

## 2018-11-13 RX ORDER — PROPOFOL 10 MG/ML
VIAL (ML) INTRAVENOUS
Status: DISCONTINUED | OUTPATIENT
Start: 2018-11-13 | End: 2018-11-13

## 2018-11-13 RX ORDER — BUPIVACAINE HYDROCHLORIDE 5 MG/ML
INJECTION, SOLUTION EPIDURAL; INTRACAUDAL
Status: DISCONTINUED | OUTPATIENT
Start: 2018-11-13 | End: 2018-11-13 | Stop reason: HOSPADM

## 2018-11-13 RX ORDER — LIDOCAINE HCL/PF 100 MG/5ML
SYRINGE (ML) INTRAVENOUS
Status: DISCONTINUED | OUTPATIENT
Start: 2018-11-13 | End: 2018-11-13

## 2018-11-13 RX ORDER — ONDANSETRON 2 MG/ML
INJECTION INTRAMUSCULAR; INTRAVENOUS
Status: DISCONTINUED | OUTPATIENT
Start: 2018-11-13 | End: 2018-11-13

## 2018-11-13 RX ORDER — MIDAZOLAM HYDROCHLORIDE 1 MG/ML
INJECTION INTRAMUSCULAR; INTRAVENOUS
Status: DISCONTINUED | OUTPATIENT
Start: 2018-11-13 | End: 2018-11-13

## 2018-11-13 RX ADMIN — LIDOCAINE HYDROCHLORIDE 100 MG: 20 INJECTION, SOLUTION INTRAVENOUS at 04:11

## 2018-11-13 RX ADMIN — FENTANYL CITRATE 25 MCG: 50 INJECTION INTRAMUSCULAR; INTRAVENOUS at 06:11

## 2018-11-13 RX ADMIN — ONDANSETRON 4 MG: 2 INJECTION, SOLUTION INTRAMUSCULAR; INTRAVENOUS at 05:11

## 2018-11-13 RX ADMIN — SUCCINYLCHOLINE CHLORIDE 120 MG: 20 INJECTION, SOLUTION INTRAMUSCULAR; INTRAVENOUS at 04:11

## 2018-11-13 RX ADMIN — SODIUM CHLORIDE, SODIUM LACTATE, POTASSIUM CHLORIDE, AND CALCIUM CHLORIDE: 600; 310; 30; 20 INJECTION, SOLUTION INTRAVENOUS at 04:11

## 2018-11-13 RX ADMIN — HYDROMORPHONE HYDROCHLORIDE 0.2 MG: 2 INJECTION INTRAMUSCULAR; INTRAVENOUS; SUBCUTANEOUS at 06:11

## 2018-11-13 RX ADMIN — PROPOFOL 200 MG: 10 INJECTION, EMULSION INTRAVENOUS at 04:11

## 2018-11-13 RX ADMIN — ONDANSETRON 4 MG: 2 INJECTION INTRAMUSCULAR; INTRAVENOUS at 06:11

## 2018-11-13 RX ADMIN — FENTANYL CITRATE 100 MCG: 50 INJECTION, SOLUTION INTRAMUSCULAR; INTRAVENOUS at 04:11

## 2018-11-13 RX ADMIN — MIDAZOLAM HYDROCHLORIDE 2 MG: 1 INJECTION, SOLUTION INTRAMUSCULAR; INTRAVENOUS at 04:11

## 2018-11-13 RX ADMIN — ROCURONIUM BROMIDE 5 MG: 10 INJECTION, SOLUTION INTRAVENOUS at 04:11

## 2018-11-13 RX ADMIN — CEFAZOLIN SODIUM 2 G: 2 SOLUTION INTRAVENOUS at 04:11

## 2018-11-13 NOTE — OP NOTE
[facility]  Surgery Department  Operative Note    SUMMARY     Date of Procedure: 11/13/2018     Procedure: Procedure(s) (LRB):  EXCISION, LIPOMA (Left)  EXCISION, CYST (N/A)     Surgeon(s) and Role:     * Juan J Bridges MD - Primary    Assisting Surgeon: None    Pre-Operative Diagnosis: Lipoma of left shoulder [D17.22]  Cyst, dermoid, face [D23.30]    Post-Operative Diagnosis: Post-Op Diagnosis Codes:     * Lipoma of left shoulder [D17.22]     * Cyst, dermoid, face [D23.30]    Anesthesia: General    Technical Procedures Used:  Excision left facial cyst and left shoulder lipoma    Description of the Findings of the Procedure:  Cyst, lipoma      Complications: No    Estimated Blood Loss (EBL): * No values recorded between 11/13/2018  4:57 PM and 11/13/2018  5:47 PM *           Implants: * No implants in log *    Specimens:   Specimen (12h ago, onward)    Start     Ordered    11/13/18 1715  Specimen to Pathology - Surgery  Once     Comments:  1.) Left Shoulder Lipoma 2.) Left Face CystDX: Left Shoulder Lipoma/ Left Face Cyst     Start Status   11/13/18 1715 Collected (11/13/18 1720)       11/13/18 1714                  Condition: Good    Disposition: PACU - hemodynamically stable.    Procedure in detail:  The patient was brought to the OR and underwent general anesthesia.  She was prepped and draped in the usual sterile fashion in the supine position. A longitudinal 4 cm incision was made over the left shoulder lipoma.  The lipoma was dissected circumferentially until free.  The wound was irrigated and examined.  No further lipoma was noted.  Local anesthetic was injected.  The incision was closed in 2 layers using 3 0 Vicryl followed by for Monocryl. A sterile pressure dressing was applied.  We then turned our attention to the left facial cyst.  An incision was made along the vermilion border of her left superior lip.  With bluntly dissected the cyst free circumferentially.  It was removed and passed  off as specimen.  Local anesthetic was injected and hemostasis noted.  The incision was closed using 5 0 nylon sutures in an interrupted fashion. Bacitracin ointment was applied. Patient was transferred to recovery in stable and satisfactory condition.

## 2018-11-13 NOTE — BRIEF OP NOTE
[facility]  Brief Operative Note     SUMMARY     Surgery Date: 11/13/2018     Surgeon(s) and Role:     * Juan J Bridges MD - Primary    Assisting Surgeon: None    Pre-op Diagnosis:  Lipoma of left shoulder [D17.22]  Cyst, dermoid, face [D23.30]    Post-op Diagnosis:  Post-Op Diagnosis Codes:     * Lipoma of left shoulder [D17.22]     * Cyst, dermoid, face [D23.30]    Procedure(s) (LRB):  EXCISION, LIPOMA (Left)  EXCISION, CYST (N/A)    Anesthesia: General    Description of the findings of the procedure:  Excision left shoulder lipoma and left facial cyst    Findings/Key Components:  See op note    Estimated Blood Loss: * No values recorded between 11/13/2018  4:57 PM and 11/13/2018  5:46 PM *         Specimens:   Specimen (12h ago, onward)    Start     Ordered    11/13/18 1715  Specimen to Pathology - Surgery  Once     Comments:  1.) Left Shoulder Lipoma 2.) Left Face CystDX: Left Shoulder Lipoma/ Left Face Cyst     Start Status   11/13/18 1715 Collected (11/13/18 1720)       11/13/18 1714          Discharge Note    SUMMARY     Admit Date: 11/13/2018    Discharge Date and Time:  11/13/2018 5:46 PM    Hospital Course patient underwent lipoma in cyst excision and was discharged postoperatively    Final Diagnosis: Post-Op Diagnosis Codes:     * Lipoma of left shoulder [D17.22]     * Cyst, dermoid, face [D23.30]    Disposition: Home or Self Care    Follow Up/Patient Instructions:     Medications:  Reconciled Home Medications:      Medication List      START taking these medications    HYDROcodone-acetaminophen 5-325 mg per tablet  Commonly known as:  NORCO  Take 1 tablet by mouth every 6 (six) hours as needed for Pain.        CONTINUE taking these medications    ibuprofen 800 MG tablet  Commonly known as:  ADVIL,MOTRIN  Take 800 mg by mouth.     ondansetron 4 MG Tbdl  Commonly known as:  ZOFRAN-ODT  Take 1 tablet (4 mg total) by mouth every 6 (six) hours as needed.          Discharge Procedure Orders    Diet general     Call MD for:  temperature >100.4     Call MD for:  persistent nausea and vomiting     Call MD for:  severe uncontrolled pain     Call MD for:  difficulty breathing, headache or visual disturbances     Call MD for:  redness, tenderness, or signs of infection (pain, swelling, redness, odor or green/yellow discharge around incision site)     Call MD for:  hives     Call MD for:  persistent dizziness or light-headedness     Call MD for:  extreme fatigue     Remove dressing in 48 hours     Wound care routine (specify)   Order Comments: Wound care routine:  Applied bacitracin to the lip incision daily     Activity as tolerated     Shower on day dressing removed (No bath)     Follow-up Information     Juan J Bridges MD In 1 week.    Specialties:  General Surgery, Bariatrics  Contact information:  [address] [place] [address] [place] [id number] [phone]

## 2018-11-13 NOTE — DISCHARGE INSTRUCTIONS
- Remove dressing to shoulder in 48 hours.    - Shower only.  Do not submerge incision under water until completely healed.    - No bath tubs, hot tubs, or pools.    - Apply bacitracin ointment to lip incision daily.     What to expect during recovery    Pain  · You will experience some level of pain after surgery.  Pain medication should help with the pain, but may not be able to eliminate it entirely.  Pain will decrease with time, and most pain will be gone by 4 to 6 weeks after surgery.  · Ice packs may help with pain and can reduce swelling.  · Your prescription pain medication may contain acetaminophen (Tylenol).  If so, you should not take additional acetaminophen (Tylenol) at the same time as your pain medication.   · Do not drive, operate machinery or power tools, or sign legal papers for 24 hours or as long as you are on your postoperative pain medication.   · Prescription pain medication should be taken only as directed.  We are not able to replace pain medication that has been lost or stolen.    Nausea/vomiting  · You may experience nausea or vomiting as a result of anesthesia or pain medication.  · If you experience severe nausea or you are unable to keep fluids down, contact your doctor.    Bleeding  · A small amount of clear or reddish drainage from the incision is normal after surgery.  · For mild bleeding from the incision, apply pressure for five minutes.  · If bleeding is severe or does not stop with pressure, contact your doctor.    Signs of infection  · Notify your doctor if you have the following signs of infection:  · Fever over 101 degrees  · Worsening redness around incsion  · Thick drainage from incision  · Foul smell from incision  · You may experience low fever/chills, this is normal after surgery.    Other post-operative symptoms  · It is safe to take over-the-counter medications for constipation, heartburn, sleep, or itching if needed.    · You may experience light-headedness,  dizziness, and sleepiness following surgery. Please do not stay alone. A responsible adult should be with you for this 24 hour period.     Activity  · Try to rest and avoid strenuous activity, but also get out of bed regularly unless your doctor has ordered strict bedrest.  · Several times every hour while you are awake, pump and flex your feet 5-6 times and do foot circles. This will help prevent blood clots.  · Several times every hour while you are awake, take 2 to 3 deep breaths and cough. If you had stomach surgery, hold a pillow or rolled towel firmly against your stomach before you cough. This will help with any pain the cough might cause.  · Do not smoke after surgery, it decreases your ability to heal and increases the risk of infection and pneumonia.    Nozin: Nasal   · Nozin reduces nasal germs to help decrease the risk of infection after surgery.  · Continue Nozin provided at discharge twice daily for 7 days or until the incision is healed.    · Place 4 drops to cotton swab tip and swab both nostril rims 6 times in each direction.  · See pamphlet for more information.     Diet  · Drink lots of fluids after surgery, unless otherwise instructed.  · You might not have much appetite at first.  Progress slowly to a normal diet unless given other specific diet instructions by your doctor.  Begin with liquids, then soup and crackers, working up to solid foods.  · Do not drink alcoholic beverages including beer for 24 hours or as long as you are on post-operative pain medication.    Follow-up after surgery  · You can contact your doctor through the patient portal using the [facility] or at [email or web address].  · You can also contact your doctor at any time by calling [phone] for the [facility] on [facility], or [phone] for the [facility] on [facility].  · A nurse will be calling you sometime after surgery. Do not be alarmed. This is our way of finding out how you are doing.

## 2018-11-13 NOTE — TRANSFER OF CARE
"Anesthesia Transfer of Care Note    Patient: Monte Lashone Weathers    Procedure(s) Performed: Procedure(s) (LRB):  EXCISION, LIPOMA (Left)  EXCISION, CYST (N/A)    Patient location: PACU    Anesthesia Type: general    Transport from OR: Transported from OR on room air with adequate spontaneous ventilation    Post pain: adequate analgesia    Post assessment: no apparent anesthetic complications and tolerated procedure well    Post vital signs: stable    Level of consciousness: awake and responds to stimulation    Nausea/Vomiting: no nausea/vomiting    Complications: none    Transfer of care protocol was followed      Last vitals:   Visit Vitals  /61 (BP Location: Right arm, Patient Position: Sitting)   Pulse (!) 45   Temp 36.6 °C (97.9 °F) (Temporal)   Resp 18   Ht 5' 6" (1.676 m)   Wt 64.6 kg (142 lb 6.7 oz)   LMP 11/06/2018   SpO2 100%   Breastfeeding? No   BMI 22.99 kg/m²     "

## 2018-11-13 NOTE — H&P
History & Physical     SUBJECTIVE:      History of Present Illness:  Patient is a 42 y.o. female referred for left shoulder lipoma.  Patient reports mass on her left shoulder has been getting bigger in size.  It does not cause her much pain but she would like to have her removed.  She also noticed a cyst just above the left side of her lip and sometimes has some tingling/numbness.  She would like this removed as well.         Chief Complaint   Patient presents with    Consult         Review of patient's allergies indicates:  No Known Allergies     Current Medications          Current Outpatient Medications   Medication Sig Dispense Refill    fluticasone (FLONASE) 50 mcg/actuation nasal spray 2 sprays (100 mcg total) by Each Nare route once daily. 16 g 0    ibuprofen (ADVIL,MOTRIN) 800 MG tablet Take 800 mg by mouth.        ondansetron (ZOFRAN-ODT) 4 MG TbDL Take 1 tablet (4 mg total) by mouth every 6 (six) hours as needed. 60 tablet 0      No current facility-administered medications for this visit.             History reviewed. No pertinent past medical history.        Past Surgical History:   Procedure Laterality Date    etopic pregnancy                Family History   Problem Relation Age of Onset    Stroke Father      Heart disease Mother        Social History           Tobacco Use    Smoking status: Never Smoker    Smokeless tobacco: Never Used   Substance Use Topics    Alcohol use: No    Drug use: No         Review of Systems:  Review of Systems   Constitutional: Positive for unexpected weight change. Negative for activity change, chills, fatigue and fever.   HENT: Negative for hearing loss, rhinorrhea and trouble swallowing.    Eyes: Negative for discharge and visual disturbance.   Respiratory: Negative for cough, chest tightness, shortness of breath, wheezing and stridor.    Cardiovascular: Negative for chest pain, palpitations and leg swelling.   Gastrointestinal: Positive for constipation.  "Negative for abdominal distention, abdominal pain, blood in stool, diarrhea, nausea and vomiting.   Endocrine: Negative for polydipsia and polyuria.   Genitourinary: Negative for difficulty urinating, dysuria, frequency, hematuria, menstrual problem and urgency.   Musculoskeletal: Negative for arthralgias, joint swelling and neck pain.   Skin: Negative for color change, pallor, rash and wound.   Neurological: Positive for headaches. Negative for weakness.   Hematological: Does not bruise/bleed easily.   Psychiatric/Behavioral: Negative for confusion and dysphoric mood.         OBJECTIVE:      Vital Signs (Most Recent)  Temp: 98.6 °F (37 °C) (08/29/18 1242)  Pulse: (!) 47 (08/29/18 1242)  BP: 108/67 (08/29/18 1242)  5' 7" (1.702 m)  64.6 kg (142 lb 6.7 oz)      Physical Exam:  Physical Exam   Constitutional: She is oriented to person, place, and time. She appears well-developed and well-nourished. No distress.   HENT:   Head: Normocephalic and atraumatic.       Eyes: EOM are normal.   Neck: Neck supple.   Cardiovascular: Normal rate and regular rhythm.   Pulmonary/Chest: Effort normal and breath sounds normal.   Abdominal: Soft. Bowel sounds are normal. She exhibits no distension. There is no tenderness.   Neurological: She is alert and oriented to person, place, and time.   Skin: Skin is warm and dry.        Vitals reviewed.           ASSESSMENT/PLAN:      42-year-old female with facial cyst and left shoulder lipoma     PLAN:Plan      Removal in OR of left shoulder lipoma and facial cyst.   Some benefits discussed with patient including:  Pain, bleeding, infection, need for further procedure        "

## 2018-11-13 NOTE — ANESTHESIA RELEASE NOTE
"Anesthesia Release from PACU Note    Patient: Monte Lashone Weathers    Procedure(s) Performed: Procedure(s) (LRB):  EXCISION, LIPOMA (Left)  EXCISION, CYST (N/A)    Anesthesia type: general    Post pain: Pain needs to be addressed    Post assessment: no apparent anesthetic complications, tolerated procedure well and no evidence of recall    Last Vitals:   Visit Vitals  /61 (BP Location: Right arm, Patient Position: Sitting)   Pulse (!) 45   Temp 36.6 °C (97.9 °F) (Temporal)   Resp 18   Ht 5' 6" (1.676 m)   Wt 64.6 kg (142 lb 6.7 oz)   LMP 11/06/2018   SpO2 100%   Breastfeeding? No   BMI 22.99 kg/m²       Post vital signs: stable    Level of consciousness: awake, alert  and oriented    Nausea/Vomiting: no nausea/no vomiting    Complications: none    Airway Patency: patent    Respiratory: unassisted, spontaneous ventilation, room air    Cardiovascular: stable and blood pressure at baseline    Hydration: euvolemic  "

## 2018-11-14 DIAGNOSIS — H65.92 MIDDLE EAR EFFUSION, LEFT: ICD-10-CM

## 2018-11-14 RX ORDER — FLUTICASONE PROPIONATE 50 MCG
SPRAY, SUSPENSION (ML) NASAL
Qty: 16 G | Refills: 0 | Status: SHIPPED | OUTPATIENT
Start: 2018-11-14

## 2018-11-14 NOTE — PHYSICIAN QUERY
PT Name: Monte Lashone Weathers  MR #: [id number]     Physician Query Form - Documentation Clarification      CDS/: Araceli Marcano               Contact information: [email or web address]    This form is a permanent document in the medical record.     Query Date: November 14, 2018    By submitting this query, we are merely seeking further clarification of documentation. Please utilize your independent clinical judgment when addressing the question(s) below.    The Medical record reflects the following:    Supporting Clinical Findings Location in Medical Record   Lipoma of left shoulder [D17.22]     * Cyst, dermoid, face [D23.30]                                                                                           Dear Doctor, Please report the size of the lipoma and cyst excised below.    Provider Use Only      ___ 6 cm __ Lipoma of left shoulder  _ 1 cm ____ Cyst, dermoid, face                                                                                                                            Clinically Undetermined

## 2018-11-14 NOTE — ANESTHESIA POSTPROCEDURE EVALUATION
"Anesthesia Post Evaluation    Patient: Monte Lashone Weathers    Procedure(s) Performed: Procedure(s) (LRB):  EXCISION, LIPOMA (Left)  EXCISION, CYST (N/A)    Final Anesthesia Type: general  Patient location during evaluation: PACU  Patient participation: Yes- Able to Participate  Level of consciousness: awake and alert  Post-procedure vital signs: reviewed and stable  Pain management: adequate  Airway patency: patent  PONV status at discharge: No PONV  Anesthetic complications: no      Cardiovascular status: blood pressure returned to baseline  Respiratory status: unassisted and spontaneous ventilation  Hydration status: euvolemic  Follow-up not needed.        Visit Vitals  /72 (BP Location: Right arm, Patient Position: Lying)   Pulse (!) 45   Temp 36.4 °C (97.6 °F) (Temporal)   Resp 10   Ht 5' 6" (1.676 m)   Wt 64.6 kg (142 lb 6.7 oz)   LMP 11/06/2018   SpO2 100%   Breastfeeding? No   BMI 22.99 kg/m²       Pain/Jeovany Score: Pain Assessment Performed: Yes (11/13/2018  6:45 PM)  Presence of Pain: complains of pain/discomfort (11/13/2018  6:45 PM)  Pain Rating Prior to Med Admin: 9 (11/13/2018  6:10 PM)  Jeovany Score: 9 (11/13/2018  6:45 PM)        "

## 2018-11-15 ENCOUNTER — PATIENT MESSAGE (OUTPATIENT)
Dept: SURGERY | Facility: CLINIC | Age: 43
End: 2018-11-15

## 2018-11-19 ENCOUNTER — TELEPHONE (OUTPATIENT)
Dept: SURGERY | Facility: CLINIC | Age: 43
End: 2018-11-19

## 2018-11-19 NOTE — TELEPHONE ENCOUNTER
Returned call in regards to upcoming appt, during conversation appt was [place] from 12/28/18 to 11/28/2018 with Dr. Bridges/YASSINE @ 11:20AM. Encouraged pt to call with any other questions/concerns. Pt voiced an understanding.

## 2018-11-24 DIAGNOSIS — M62.838 NECK MUSCLE SPASM: ICD-10-CM

## 2018-11-24 RX ORDER — CYCLOBENZAPRINE HCL 5 MG
TABLET ORAL
Qty: 30 TABLET | Refills: 0 | OUTPATIENT
Start: 2018-11-24

## 2018-11-26 ENCOUNTER — OFFICE VISIT (OUTPATIENT)
Dept: SURGERY | Facility: CLINIC | Age: 43
End: 2018-11-26
Payer: MEDICAID

## 2018-11-26 VITALS
WEIGHT: 144.19 LBS | HEART RATE: 46 BPM | SYSTOLIC BLOOD PRESSURE: 110 MMHG | HEIGHT: 66 IN | DIASTOLIC BLOOD PRESSURE: 66 MMHG | BODY MASS INDEX: 23.17 KG/M2 | TEMPERATURE: 98 F

## 2018-11-26 DIAGNOSIS — D17.22 LIPOMA OF LEFT SHOULDER: Primary | ICD-10-CM

## 2018-11-26 PROCEDURE — 99213 OFFICE O/P EST LOW 20 MIN: CPT | Mod: PBBFAC,PO | Performed by: SURGERY

## 2018-11-26 PROCEDURE — 99024 POSTOP FOLLOW-UP VISIT: CPT | Mod: ,,, | Performed by: SURGERY

## 2018-11-26 PROCEDURE — 99999 PR PBB SHADOW E&M-EST. PATIENT-LVL III: CPT | Mod: PBBFAC,,, | Performed by: SURGERY

## 2018-11-26 NOTE — PROGRESS NOTES
Subjective:       Patient ID: Monte Lashone Weathers is a 43 y.o. female.    Chief Complaint: Post-op Evaluation    HPI status post excision left shoulder lipoma and left face cyst.  She presents for suture removal and postop.  She is doing well with no complaints.  Review of Systems    Objective:      Physical Exam   Constitutional: She appears well-nourished. No distress.   Skin:        Vitals reviewed.      Assessment:   Status post left shoulder lipoma excision and left face cyst removal  Plan:       Pathology pending call patient with results  Healing well

## 2018-12-07 ENCOUNTER — PATIENT MESSAGE (OUTPATIENT)
Dept: SURGERY | Facility: HOSPITAL | Age: 43
End: 2018-12-07

## 2018-12-07 DIAGNOSIS — L98.9 FACIAL LESION: Primary | ICD-10-CM

## 2018-12-07 NOTE — TELEPHONE ENCOUNTER
FINAL PATHOLOGIC DIAGNOSIS  1. Left shoulder lipoma:  Lipoma.  2. Left face cyst:  See [facility] diagnosis.  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  ANCILLARY STUDIES:  Immunohistochemical studies performed on paraffin-embedded tissue reveal that the neoplastic cells are positive  for CK AE1/AE3, S100 and SMA (focal).    Will have patient scheduled for follow-up for reexcision of left facial lesion.

## 2018-12-12 ENCOUNTER — OFFICE VISIT (OUTPATIENT)
Dept: OTOLARYNGOLOGY | Facility: CLINIC | Age: 43
End: 2018-12-12
Payer: MEDICAID

## 2018-12-12 VITALS
DIASTOLIC BLOOD PRESSURE: 65 MMHG | HEART RATE: 66 BPM | WEIGHT: 145.06 LBS | BODY MASS INDEX: 23.31 KG/M2 | TEMPERATURE: 98 F | HEIGHT: 66 IN | SYSTOLIC BLOOD PRESSURE: 113 MMHG

## 2018-12-12 DIAGNOSIS — D36.9: Primary | ICD-10-CM

## 2018-12-12 PROCEDURE — 99999 PR PBB SHADOW E&M-EST. PATIENT-LVL III: CPT | Mod: PBBFAC,,, | Performed by: ORTHOPAEDIC SURGERY

## 2018-12-12 PROCEDURE — 99204 OFFICE O/P NEW MOD 45 MIN: CPT | Mod: S$PBB,,, | Performed by: ORTHOPAEDIC SURGERY

## 2018-12-12 PROCEDURE — 99213 OFFICE O/P EST LOW 20 MIN: CPT | Mod: PBBFAC,PO | Performed by: ORTHOPAEDIC SURGERY

## 2018-12-12 NOTE — PROGRESS NOTES
Subjective:       Patient ID: Monte Lashone Weathers is a 43 y.o. female.    Chief Complaint: Consult    Patient is a very pleasant 43 year old female here to see me today for evaluation of a lesion on her face.  She recently underwent excision of a lipoma, and also had simultaneous of a subcutaneous nodule at the same time.  She says that the lesion has been present for over 10 years, and had grown slightly.  There is still some nodularity in the area.  She had no pain associated with the lesion before, but has had some pain following the procedure.  She has had some numbness prior to the procedure across her entire lip, crossing midline.  That has not occurred since surgery.      Review of Systems   Constitutional: Negative for chills, fatigue, fever and unexpected weight change.   HENT: Positive for congestion and mouth sores (site of surgical excision). Negative for dental problem, ear discharge, ear pain, facial swelling, hearing loss, nosebleeds, postnasal drip, rhinorrhea, sinus pressure, sneezing, sore throat, tinnitus, trouble swallowing and voice change.    Eyes: Negative for redness, itching and visual disturbance.   Respiratory: Negative for cough, choking, shortness of breath and wheezing.    Cardiovascular: Negative for chest pain and palpitations.   Gastrointestinal: Negative for abdominal pain.        No reflux.   Musculoskeletal: Negative for gait problem.   Skin: Negative for rash.   Neurological: Positive for headaches. Negative for dizziness and light-headedness.       Objective:      Physical Exam   Constitutional: She is oriented to person, place, and time. She appears well-developed and well-nourished. No distress.   HENT:   Head: Normocephalic and atraumatic.   Right Ear: Tympanic membrane, external ear and ear canal normal.   Left Ear: Tympanic membrane, external ear and ear canal normal.   Nose: Nose normal. No mucosal edema, rhinorrhea, nasal deformity or septal deviation. No epistaxis.  Right sinus exhibits no maxillary sinus tenderness and no frontal sinus tenderness. Left sinus exhibits no maxillary sinus tenderness and no frontal sinus tenderness.   Mouth/Throat: Uvula is midline, oropharynx is clear and moist and mucous membranes are normal. Mucous membranes are not pale and not dry. No dental caries. No oropharyngeal exudate or posterior oropharyngeal erythema.   Smooth 1 cm nodularity left upper lip, subcutaneous, soft   Eyes: Conjunctivae, EOM and lids are normal. Pupils are equal, round, and reactive to light. Right eye exhibits no chemosis. Left eye exhibits no chemosis. Right conjunctiva is not injected. Left conjunctiva is not injected. No scleral icterus. Right eye exhibits normal extraocular motion and no nystagmus. Left eye exhibits normal extraocular motion and no nystagmus.   Neck: Trachea normal and phonation normal. No tracheal tenderness present. No tracheal deviation present. No thyroid mass and no thyromegaly present.   Cardiovascular: Intact distal pulses.   Pulmonary/Chest: Effort normal. No stridor. No respiratory distress.   Abdominal: She exhibits no distension.   Lymphadenopathy:        Head (right side): No submental, no submandibular, no preauricular, no posterior auricular and no occipital adenopathy present.        Head (left side): No submental, no submandibular, no preauricular, no posterior auricular and no occipital adenopathy present.     She has no cervical adenopathy.   Neurological: She is alert and oriented to person, place, and time. No cranial nerve deficit.   Skin: Skin is warm and dry. No rash noted. No erythema.   Psychiatric: She has a normal mood and affect. Her behavior is normal.         PATHOLOGY:  [place] FINAL DIAGNOSIS:  Soft tissue, left face cyst, biopsy ([id number]; 11-): Cutaneous myoepithelioma, incompletely excised.    Assessment:       1. Benign myoepithelioma        Plan:       1.  Benign myoepithelioma:  The review diagnosis of  benign my epithelioma extensively with the patient.  Per the pathology report was incompletely excised, and there is some residual nodularity.  However, she feels that the residual nodularity is in a different location in the initial mass was.  We did discuss the high risk of recurrence of this pathology even with complete excision.  At this point, she is only 1-2 weeks in the postoperative period.  I would recommend close observation only, and would like for her to return to clinic in 4-6 weeks.  At that point, hopefully will be able to differentiate a residual mass versus scar tissue.  We discussed that she will most likely need a revision procedure the for complete excision, and will try and use the same incision that was previously.  Call for sooner appointment if there is any significant growth in the nodule.

## 2018-12-12 NOTE — LETTER
December 14, 2018      Juan J Bridges MD  [id number] [place] [address] [address] [place]C[address] [id number]           [facility]  [address] [place] [address]  Phone: [phone]  Fax: [phone]          Patient: Monte Lashone Weathers   MR Number: [id number]   YOB: 1975   Date of Visit: 12/12/2018       Dear Dr. Juan J Bridges:    Thank you for referring Monte Lashone Weathers to me for evaluation. Attached you will find relevant portions of my assessment and plan of care.    If you have questions, please do not hesitate to call me. I look forward to following Monte Lashone Weathers along with you.    Sincerely,    Karla Garcia MD    Enclosure  CC:  No Recipients    If you would like to receive this communication electronically, please contact [email or web address] or [phone] to request more information on [employer] Link access.    For providers and/or their staff who would like to refer a patient to [facility], please contact us through our one-stop-shop provider referral line, [facility] , at [phone].    If you feel you have received this communication in error or would no longer like to receive these types of communications, please e-mail [email or web address]

## 2020-04-12 ENCOUNTER — HOSPITAL ENCOUNTER (EMERGENCY)
Facility: HOSPITAL | Age: 45
Discharge: HOME | End: 2020-04-12
Attending: FAMILY MEDICINE
Payer: MEDICAID

## 2020-04-12 VITALS
WEIGHT: 145 LBS | SYSTOLIC BLOOD PRESSURE: 118 MMHG | DIASTOLIC BLOOD PRESSURE: 71 MMHG | RESPIRATION RATE: 20 BRPM | TEMPERATURE: 99 F | HEIGHT: 66 IN | BODY MASS INDEX: 23.3 KG/M2 | OXYGEN SATURATION: 100 % | HEART RATE: 40 BPM

## 2020-04-12 DIAGNOSIS — R00.1 CHRONIC SINUS BRADYCARDIA: ICD-10-CM

## 2020-04-12 DIAGNOSIS — R07.89 ATYPICAL CHEST PAIN: Primary | ICD-10-CM

## 2020-04-12 DIAGNOSIS — R07.9 CHEST PAIN: ICD-10-CM

## 2020-04-12 LAB
ALBUMIN SERPL BCP-MCNC: 4.9 G/DL (ref 3.5–5.2)
ALP SERPL-CCNC: 42 U/L (ref 55–135)
ALT SERPL W/O P-5'-P-CCNC: 16 U/L (ref 10–44)
AMPHET+METHAMPHET UR QL: NEGATIVE
ANION GAP SERPL CALC-SCNC: 14 MMOL/L (ref 8–16)
APTT BLDCRRT: 29.8 SEC (ref 21–32)
AST SERPL-CCNC: 24 U/L (ref 10–40)
BARBITURATES UR QL SCN>200 NG/ML: NEGATIVE
BASOPHILS # BLD AUTO: 0.04 K/UL (ref 0–0.2)
BASOPHILS NFR BLD: 0.6 % (ref 0–1.9)
BENZODIAZ UR QL SCN>200 NG/ML: NEGATIVE
BILIRUB SERPL-MCNC: 0.3 MG/DL (ref 0.1–1)
BILIRUB UR QL STRIP: NEGATIVE
BNP SERPL-MCNC: 20 PG/ML (ref 0–99)
BUN SERPL-MCNC: 11 MG/DL (ref 6–20)
BZE UR QL SCN: NEGATIVE
CALCIUM SERPL-MCNC: 10.2 MG/DL (ref 8.7–10.5)
CANNABINOIDS UR QL SCN: NEGATIVE
CHLORIDE SERPL-SCNC: 104 MMOL/L (ref 95–110)
CLARITY UR: CLEAR
CO2 SERPL-SCNC: 21 MMOL/L (ref 23–29)
COLOR UR: YELLOW
CREAT SERPL-MCNC: 0.8 MG/DL (ref 0.5–1.4)
CREAT UR-MCNC: 14.3 MG/DL (ref 15–325)
DIFFERENTIAL METHOD: NORMAL
EOSINOPHIL # BLD AUTO: 0.1 K/UL (ref 0–0.5)
EOSINOPHIL NFR BLD: 2.1 % (ref 0–8)
ERYTHROCYTE [DISTWIDTH] IN BLOOD BY AUTOMATED COUNT: 12.7 % (ref 11.5–14.5)
EST. GFR  (AFRICAN AMERICAN): >60 ML/MIN/1.73 M^2
EST. GFR  (NON AFRICAN AMERICAN): >60 ML/MIN/1.73 M^2
GLUCOSE SERPL-MCNC: 95 MG/DL (ref 70–110)
GLUCOSE UR QL STRIP: ABNORMAL
HCT VFR BLD AUTO: 40.1 % (ref 37–48.5)
HGB BLD-MCNC: 12.9 G/DL (ref 12–16)
HGB UR QL STRIP: ABNORMAL
HIV 1+2 AB+HIV1 P24 AG SERPL QL IA: NEGATIVE
IMM GRANULOCYTES # BLD AUTO: 0.02 K/UL (ref 0–0.04)
IMM GRANULOCYTES NFR BLD AUTO: 0.3 % (ref 0–0.5)
INR PPP: 1 (ref 0.8–1.2)
KETONES UR QL STRIP: ABNORMAL
LEUKOCYTE ESTERASE UR QL STRIP: NEGATIVE
LIPASE SERPL-CCNC: 18 U/L (ref 4–60)
LYMPHOCYTES # BLD AUTO: 2.3 K/UL (ref 1–4.8)
LYMPHOCYTES NFR BLD: 34.7 % (ref 18–48)
MAGNESIUM SERPL-MCNC: 1.8 MG/DL (ref 1.6–2.6)
MCH RBC QN AUTO: 29.1 PG (ref 27–31)
MCHC RBC AUTO-ENTMCNC: 32.2 G/DL (ref 32–36)
MCV RBC AUTO: 91 FL (ref 82–98)
METHADONE UR QL SCN>300 NG/ML: NEGATIVE
MONOCYTES # BLD AUTO: 0.5 K/UL (ref 0.3–1)
MONOCYTES NFR BLD: 6.9 % (ref 4–15)
NEUTROPHILS # BLD AUTO: 3.6 K/UL (ref 1.8–7.7)
NEUTROPHILS NFR BLD: 55.4 % (ref 38–73)
NITRITE UR QL STRIP: NEGATIVE
NRBC BLD-RTO: 0 /100 WBC
OPIATES UR QL SCN: NEGATIVE
PCP UR QL SCN>25 NG/ML: NEGATIVE
PH UR STRIP: 7 [PH] (ref 5–8)
PLATELET # BLD AUTO: 311 K/UL (ref 150–350)
PMV BLD AUTO: 9.6 FL (ref 9.2–12.9)
POTASSIUM SERPL-SCNC: 3.4 MMOL/L (ref 3.5–5.1)
PROT SERPL-MCNC: 8.5 G/DL (ref 6–8.4)
PROT UR QL STRIP: NEGATIVE
PROTHROMBIN TIME: 10.3 SEC (ref 9–12.5)
RBC # BLD AUTO: 4.43 M/UL (ref 4–5.4)
SARS-COV-2 RDRP RESP QL NAA+PROBE: NEGATIVE
SODIUM SERPL-SCNC: 139 MMOL/L (ref 136–145)
SP GR UR STRIP: 1.01 (ref 1–1.03)
TOXICOLOGY INFORMATION: ABNORMAL
TROPONIN I SERPL DL<=0.01 NG/ML-MCNC: 0.01 NG/ML (ref 0–0.03)
TROPONIN I SERPL DL<=0.01 NG/ML-MCNC: <0.006 NG/ML (ref 0–0.03)
URN SPEC COLLECT METH UR: ABNORMAL
UROBILINOGEN UR STRIP-ACNC: NEGATIVE EU/DL
WBC # BLD AUTO: 6.54 K/UL (ref 3.9–12.7)

## 2020-04-12 PROCEDURE — 99285 EMERGENCY DEPT VISIT HI MDM: CPT | Mod: 25

## 2020-04-12 PROCEDURE — 96374 THER/PROPH/DIAG INJ IV PUSH: CPT

## 2020-04-12 PROCEDURE — 86703 HIV-1/HIV-2 1 RESULT ANTBDY: CPT

## 2020-04-12 PROCEDURE — 93010 EKG 12-LEAD: ICD-10-PCS | Mod: ,,, | Performed by: INTERNAL MEDICINE

## 2020-04-12 PROCEDURE — 25000003 PHARM REV CODE 250: Performed by: EMERGENCY MEDICINE

## 2020-04-12 PROCEDURE — 25500020 PHARM REV CODE 255: Performed by: EMERGENCY MEDICINE

## 2020-04-12 PROCEDURE — 85730 THROMBOPLASTIN TIME PARTIAL: CPT

## 2020-04-12 PROCEDURE — 80053 COMPREHEN METABOLIC PANEL: CPT

## 2020-04-12 PROCEDURE — 85025 COMPLETE CBC W/AUTO DIFF WBC: CPT

## 2020-04-12 PROCEDURE — 63700000 PHARM REV CODE 250 ALT 637 W/O HCPCS: Performed by: EMERGENCY MEDICINE

## 2020-04-12 PROCEDURE — 84484 ASSAY OF TROPONIN QUANT: CPT | Mod: 91

## 2020-04-12 PROCEDURE — 80307 DRUG TEST PRSMV CHEM ANLYZR: CPT

## 2020-04-12 PROCEDURE — 83880 ASSAY OF NATRIURETIC PEPTIDE: CPT

## 2020-04-12 PROCEDURE — 25000003 PHARM REV CODE 250: Performed by: FAMILY MEDICINE

## 2020-04-12 PROCEDURE — 83690 ASSAY OF LIPASE: CPT

## 2020-04-12 PROCEDURE — 84484 ASSAY OF TROPONIN QUANT: CPT

## 2020-04-12 PROCEDURE — U0002 COVID-19 LAB TEST NON-CDC: HCPCS

## 2020-04-12 PROCEDURE — 93005 ELECTROCARDIOGRAM TRACING: CPT

## 2020-04-12 PROCEDURE — 63600175 PHARM REV CODE 636 W HCPCS: Performed by: EMERGENCY MEDICINE

## 2020-04-12 PROCEDURE — 83735 ASSAY OF MAGNESIUM: CPT

## 2020-04-12 PROCEDURE — 85610 PROTHROMBIN TIME: CPT

## 2020-04-12 PROCEDURE — 93010 ELECTROCARDIOGRAM REPORT: CPT | Mod: ,,, | Performed by: INTERNAL MEDICINE

## 2020-04-12 PROCEDURE — 81003 URINALYSIS AUTO W/O SCOPE: CPT | Mod: 59

## 2020-04-12 RX ORDER — ACETAMINOPHEN 325 MG/1
650 TABLET ORAL
Status: COMPLETED | OUTPATIENT
Start: 2020-04-12 | End: 2020-04-12

## 2020-04-12 RX ORDER — ASPIRIN 325 MG
325 TABLET ORAL
Status: COMPLETED | OUTPATIENT
Start: 2020-04-12 | End: 2020-04-12

## 2020-04-12 RX ORDER — KETOROLAC TROMETHAMINE 30 MG/ML
30 INJECTION, SOLUTION INTRAMUSCULAR; INTRAVENOUS
Status: COMPLETED | OUTPATIENT
Start: 2020-04-12 | End: 2020-04-12

## 2020-04-12 RX ORDER — NITROGLYCERIN 0.4 MG/1
0.4 TABLET SUBLINGUAL
Status: COMPLETED | OUTPATIENT
Start: 2020-04-12 | End: 2020-04-12

## 2020-04-12 RX ADMIN — IOHEXOL 100 ML: 350 INJECTION, SOLUTION INTRAVENOUS at 07:04

## 2020-04-12 RX ADMIN — NITROGLYCERIN 0.4 MG: 0.4 TABLET, ORALLY DISINTEGRATING SUBLINGUAL at 06:04

## 2020-04-12 RX ADMIN — KETOROLAC TROMETHAMINE 30 MG: 30 INJECTION, SOLUTION INTRAMUSCULAR at 07:04

## 2020-04-12 RX ADMIN — ACETAMINOPHEN 650 MG: 325 TABLET ORAL at 07:04

## 2020-04-12 RX ADMIN — ASPIRIN 325 MG: 325 TABLET, FILM COATED ORAL at 05:04

## 2020-04-12 NOTE — ED PROVIDER NOTES
"SCRIBE #1 NOTE: I, Tenzin Adam, am scribing for, and in the presence of, Edith Mayorga MD. I have scribed the HPI, ROS, and PEx.     SCRIBE #2 NOTE: I, Srikanth Sin, am scribing for, and in the presence of,  Savannah Singh MD. I have scribed the remaining portions of the note not scribed by Scribe #1.     History      Chief Complaint   Patient presents with    Chest Pain       Review of patient's allergies indicates:  No Known Allergies     HPI   HPI    4/12/2020, 6:13 AM   History obtained from the patient      History of Present Illness: Monte Lashone Weathers is a 44 y.o. female patient with a PMHx of bradycardia who presents to the Emergency Department for evaluation of intermittent chest pain which onset gradually several years PTA but has exacerbated in the past 2 weeks. Pt states her pain has become more frequent. Symptoms are intermittent and moderate in severity, with pt describing the pain as a "squeezing pressure" in the middle of their chest that does not radiate. Pt states that the pain prompts her to cough, but she does not have a cough independent of the chest pain. Pt reports a hx of bradycardia that is known to her cardiologist, and reports that her baseline heart rate is in the 40's. No mitigating factors reported; Sxs are usually mitigated after drinking water except when episode started around 330 am it did not help. Pt reports sx exacerbation when coughing. Associated sxs include cough and SOB. Patient denies any fever, chills, HA, leg swelling, weakness, numbness, and all other sxs at this time. No prior Tx reported. Pt reports an extensive outpatient workup for including jacome monitor done several months ago. Pt reports she does not currently have a cardiology b/c her past cardiologist discharged her b/c everything had been normal. No further complaints or concerns at this time.        Arrival mode: Personal vehicle    PCP: Judith Carroll MD       Past Medical History:  Past " "Medical History:   Diagnosis Date    Bradycardia        Past Surgical History:  Past Surgical History:   Procedure Laterality Date    CHOLECYSTECTOMY      CYST REMOVAL N/A 11/13/2018    Procedure: EXCISION, CYST;  Surgeon: Juan J Bridges MD;  Location: [facility] OR;  Service: General;  Laterality: N/A;    etopic pregnancy      GALLBLADDER SURGERY      LIPOMA RESECTION Left 11/13/2018    Procedure: EXCISION, LIPOMA;  Surgeon: Juan J Bridges MD;  Location: [facility] OR;  Service: General;  Laterality: Left;         Family History:  Family History   Problem Relation Age of Onset    Stroke Father     Heart disease Mother        Social History:  Social History     Tobacco Use    Smoking status: Never Smoker    Smokeless tobacco: Never Used   Substance and Sexual Activity    Alcohol use: No    Drug use: No    Sexual activity: Never       ROS   Review of Systems   Constitutional: Negative for chills and fever.   HENT: Negative for sore throat.    Respiratory: Positive for cough and shortness of breath.    Cardiovascular: Positive for chest pain (Intermittent; "Squeezing pressure"). Negative for leg swelling.   Gastrointestinal: Negative for abdominal pain, diarrhea, nausea and vomiting.   Genitourinary: Negative for dysuria.   Musculoskeletal: Negative for back pain, neck pain and neck stiffness.   Skin: Negative for rash and wound.   Neurological: Negative for dizziness, weakness, light-headedness, numbness and headaches.   Hematological: Does not bruise/bleed easily.   All other systems reviewed and are negative.    Physical Exam      Initial Vitals   BP Pulse Resp Temp SpO2   04/12/20 0440 04/12/20 0440 04/12/20 0440 04/12/20 0449 04/12/20 0440   (!) 155/90 (!) 44 16 98.9 °F (37.2 °C) 100 %      MAP       --                 Physical Exam  Nursing Notes and Vital Signs Reviewed.  Constitutional: Patient is in no acute distress. Well-developed and well-nourished.  Head: Atraumatic. Normocephalic.  Eyes: PERRL. EOM " "intact. Conjunctivae are not pale. No scleral icterus.  ENT: Mucous membranes are moist. Oropharynx is clear and symmetric.    Neck: Supple. Full ROM. No lymphadenopathy.  Cardiovascular: Bradycardic. Regular rhythm. No murmurs, rubs, or gallops. Distal pulses are 2+ and symmetric.  Pulmonary/Chest: No respiratory distress. Clear to auscultation bilaterally. No wheezing or rales. No reproducible pain.  Abdominal: Soft and non-distended.  There is no tenderness.  No rebound, guarding, or rigidity. Good bowel sounds.  Genitourinary: No CVA tenderness  Musculoskeletal: Moves all extremities. No obvious deformities. No edema. No calf tenderness.  Skin: Warm and dry.  Neurological:  Alert, awake, and appropriate.  Normal speech.  No acute focal neurological deficits are appreciated.  Psychiatric: Normal affect. Good eye contact. Appropriate in content.    ED Course    Procedures  ED Vital Signs:  Vitals:    04/12/20 0440 04/12/20 0449 04/12/20 0608 04/12/20 0646   BP: (!) 155/90 (!) 150/80 (!) 156/79 126/71   Pulse: (!) 44 (!) 47 (!) 43 (!) 52   Resp: 16 18 20    Temp:  98.9 °F (37.2 °C)     TempSrc:  Oral     SpO2: 100% 100% 100% 100%   Weight: 65.8 kg (145 lb)      Height: 5' 6" (1.676 m)       04/12/20 0701 04/12/20 0703 04/12/20 0730 04/12/20 0731   BP: 117/73  137/81    Pulse:  (!) 52  (!) 58   Resp: 20   (!) 23   Temp:       TempSrc:       SpO2: 100%   100%   Weight:       Height:        04/12/20 0801 04/12/20 0831 04/12/20 0832 04/12/20 0932   BP: 127/73  126/70 118/71   Pulse: (!) 43 (!) 46     Resp: 20  20    Temp:       TempSrc:       SpO2: 100%  100% 100%   Weight:       Height:        04/12/20 0933   BP:    Pulse: (!) 40   Resp: 20   Temp:    TempSrc:    SpO2: 100%   Weight:    Height:        Abnormal Lab Results:  Labs Reviewed   COMPREHENSIVE METABOLIC PANEL - Abnormal; Notable for the following components:       Result Value    Potassium 3.4 (*)     CO2 21 (*)     Total Protein 8.5 (*)     Alkaline " Phosphatase 42 (*)     All other components within normal limits   DRUG SCREEN PANEL, URINE EMERGENCY - Abnormal; Notable for the following components:    Creatinine, Random Ur 14.3 (*)     All other components within normal limits    Narrative:     Preferred Collection Type->Urine, Clean Catch   URINALYSIS, REFLEX TO URINE CULTURE - Abnormal; Notable for the following components:    Glucose, UA Trace (*)     Ketones, UA Trace (*)     Occult Blood UA Trace (*)     All other components within normal limits    Narrative:     Preferred Collection Type->Urine, Clean Catch   HIV 1 / 2 ANTIBODY   CBC W/ AUTO DIFFERENTIAL   B-TYPE NATRIURETIC PEPTIDE   TROPONIN I   PROTIME-INR   APTT   LIPASE   MAGNESIUM   APTT   MAGNESIUM   LIPASE   SARS-COV-2 RNA AMPLIFICATION, QUAL    Narrative:     What symptom criteria does the patient meet?->Cough   TROPONIN I        All Lab Results:  Results for orders placed or performed during the hospital encounter of 04/12/20   HIV 1/2 Ag/Ab (4th Gen)   Result Value Ref Range    HIV 1/2 Ag/Ab Negative Negative   CBC auto differential   Result Value Ref Range    WBC 6.54 3.90 - 12.70 K/uL    RBC 4.43 4.00 - 5.40 M/uL    Hemoglobin 12.9 12.0 - 16.0 g/dL    Hematocrit 40.1 37.0 - 48.5 %    Mean Corpuscular Volume 91 82 - 98 fL    Mean Corpuscular Hemoglobin 29.1 27.0 - 31.0 pg    Mean Corpuscular Hemoglobin Conc 32.2 32.0 - 36.0 g/dL    RDW 12.7 11.5 - 14.5 %    Platelets 311 150 - 350 K/uL    MPV 9.6 9.2 - 12.9 fL    Immature Granulocytes 0.3 0.0 - 0.5 %    Gran # (ANC) 3.6 1.8 - 7.7 K/uL    Immature Grans (Abs) 0.02 0.00 - 0.04 K/uL    Lymph # 2.3 1.0 - 4.8 K/uL    Mono # 0.5 0.3 - 1.0 K/uL    Eos # 0.1 0.0 - 0.5 K/uL    Baso # 0.04 0.00 - 0.20 K/uL    nRBC 0 0 /100 WBC    Gran% 55.4 38.0 - 73.0 %    Lymph% 34.7 18.0 - 48.0 %    Mono% 6.9 4.0 - 15.0 %    Eosinophil% 2.1 0.0 - 8.0 %    Basophil% 0.6 0.0 - 1.9 %    Differential Method Automated    Comprehensive metabolic panel   Result Value Ref  Range    Sodium 139 136 - 145 mmol/L    Potassium 3.4 (L) 3.5 - 5.1 mmol/L    Chloride 104 95 - 110 mmol/L    CO2 21 (L) 23 - 29 mmol/L    Glucose 95 70 - 110 mg/dL    BUN, Bld 11 6 - 20 mg/dL    Creatinine 0.8 0.5 - 1.4 mg/dL    Calcium 10.2 8.7 - 10.5 mg/dL    Total Protein 8.5 (H) 6.0 - 8.4 g/dL    Albumin 4.9 3.5 - 5.2 g/dL    Total Bilirubin 0.3 0.1 - 1.0 mg/dL    Alkaline Phosphatase 42 (L) 55 - 135 U/L    AST 24 10 - 40 U/L    ALT 16 10 - 44 U/L    Anion Gap 14 8 - 16 mmol/L    eGFR if African American >60 >60 mL/min/1.73 m^2    eGFR if non African American >60 >60 mL/min/1.73 m^2   Brain natriuretic peptide   Result Value Ref Range    BNP 20 0 - 99 pg/mL   Troponin I   Result Value Ref Range    Troponin I <0.006 0.000 - 0.026 ng/mL   Protime-INR   Result Value Ref Range    Prothrombin Time 10.3 9.0 - 12.5 sec    INR 1.0 0.8 - 1.2   Drug screen panel, emergency   Result Value Ref Range    Benzodiazepines Negative     Methadone metabolites Negative     Cocaine (Metab.) Negative     Opiate Scrn, Ur Negative     Barbiturate Screen, Ur Negative     Amphetamine Screen, Ur Negative     THC Negative     Phencyclidine Negative     Creatinine, Random Ur 14.3 (L) 15.0 - 325.0 mg/dL    Toxicology Information SEE COMMENT    Urinalysis, Reflex to Urine Culture Urine, Clean Catch   Result Value Ref Range    Specimen UA Urine, Clean Catch     Color, UA Yellow Yellow, Straw, Christal    Appearance, UA Clear Clear    pH, UA 7.0 5.0 - 8.0    Specific Gravity, UA 1.010 1.005 - 1.030    Protein, UA Negative Negative    Glucose, UA Trace (A) Negative    Ketones, UA Trace (A) Negative    Bilirubin (UA) Negative Negative    Occult Blood UA Trace (A) Negative    Nitrite, UA Negative Negative    Urobilinogen, UA Negative <2.0 EU/dL    Leukocytes, UA Negative Negative   APTT   Result Value Ref Range    aPTT 29.8 21.0 - 32.0 sec   Magnesium   Result Value Ref Range    Magnesium 1.8 1.6 - 2.6 mg/dL   Lipase   Result Value Ref Range     Lipase 18 4 - 60 U/L   COVID-19 Routine Screening   Result Value Ref Range    SARS-CoV-2 RNA, Amplification, Qual Negative Negative   Troponin I   Result Value Ref Range    Troponin I 0.006 0.000 - 0.026 ng/mL         Imaging Results:  Imaging Results          CTA Chest Non-Coronary (PE Study) (Final result)  Result time 04/12/20 07:44:40    Final result by Joel Ponce Jr., MD (04/12/20 07:44:40)                 Impression:      No evidence of pulmonary embolism or other significant CT abnormality of the chest.      Electronically signed by: Joel Ponce Jr., MD  Date:    04/12/2020  Time:    07:44             Narrative:    EXAMINATION:  CTA CHEST NON CORONARY    CLINICAL HISTORY:  Chest pain, normal ekg;    TECHNIQUE:  Routine CT angiogram of the chest protocol performed after the IV administration of 100 mL Omnipaque 350. 3D reconstructions/reformats/MIPs obtained. All CT scans at this facility use dose modulation, iterative reconstruction, and/or weight based dosing when appropriate to reduce radiation dose to as low as reasonably achievable.    COMPARISON:  None    FINDINGS:  There is no evidence of pulmonary embolism. The lungs are clear. No pleural fluid, pleural thickening, or pneumothorax. No pathologically enlarged lymph nodes are within the chest. No aortic aneurysm or dissection is identified.  Normal size of the heart. No acute osseous abnormalities.  Limited images through the upper abdomen demonstrate no acute findings.                               X-Ray Chest PA And Lateral (Final result)  Result time 04/12/20 08:30:16    Final result by Oskar Garcia MD (04/12/20 08:30:16)                 Impression:      No acute process seen.      Electronically signed by: Oskar Garcia MD  Date:    04/12/2020  Time:    08:30             Narrative:    EXAMINATION:  XR CHEST PA AND LATERAL    CLINICAL HISTORY:  Chest Pain;    COMPARISON:  03/30/2013    FINDINGS:  Cardiac silhouette is normal.  The lungs  demonstrate no evidence of active disease.  No evidence of pleural effusion or pneumothorax.  Bones appear intact.                               The EKG was ordered, reviewed, and independently interpreted by the ED provider.  Interpretation time: 04:58  Rate: 47 BPM  Rhythm: junctional rhythm  Interpretation: No acute ischemic changes. No STEMI.  When compared to prior ECGs, there are no significant changes.    6:46 AM: Per ED physician, pt's CXR results: NAF.             The Emergency Provider reviewed the vital signs and test results, which are outlined above.    ED Discussion     6:00 AM: Dr. Mayorga transfers care of pt to Dr. Singh pending lab results.    7:15 AM: Pt was given nitro with no change in sxs.     9:24 AM: Reassessed pt at this time. Heart rate and ECG at baseline, BP stable, no need for admission at this time, can have further workup by cardiology outpatient.  Pt states her condition has improved at this time. Discussed with pt all pertinent ED information and results. Discussed pt dx and plan of tx. Gave pt all outpatient f/u with cardiology and return to the ED instructions. All questions and concerns were addressed at this time. Pt expresses understanding of information and instructions, and is comfortable with plan to discharge. Pt is stable for discharge.    I discussed with patient and/or family/caretaker that evaluation in the ED does not suggest any emergent or life threatening medical conditions requiring immediate intervention beyond what was provided in the ED, and I believe patient is safe for discharge.  Regardless, an unremarkable evaluation in the ED does not preclude the development or presence of a serious of life threatening condition. As such, patient was instructed to return immediately for any worsening or change in current symptoms.         ED Medication(s):  Medications   aspirin tablet 325 mg (325 mg Oral Given 4/12/20 1506)   nitroGLYCERIN SL tablet 0.4 mg (0.4 mg  Sublingual Given 4/12/20 0642)   acetaminophen tablet 650 mg (650 mg Oral Given 4/12/20 0711)   ketorolac injection 30 mg (30 mg Intravenous Given 4/12/20 0711)   iohexoL (OMNIPAQUE 350) injection 100 mL (100 mLs Intravenous Given 4/12/20 0726)     Discharge Medication List as of 4/12/2020  9:21 AM        This patient has no discharge medications on file.     Follow-up Information     [facility]. Schedule an appointment as soon as possible for a visit in 2 days.    Specialty:  Cardiology  Why:  Return to the Emergency Room, If symptoms worsen  Contact information:  [id number] [facility] [id number] [phone]  Additional information:  3rd Floor - From [address], take the [facility] exit ([address] Enter the facility from the [address]                   Medical Decision Making    Medical Decision Making:   Clinical Tests:   Lab Tests: Ordered and Reviewed  Radiological Study: Ordered and Reviewed  Medical Tests: Ordered and Reviewed           Scribe Attestation:   Scribe #1: I performed the above scribed service and the documentation accurately describes the services I performed. I attest to the accuracy of the note.    Attending:   Physician Attestation Statement for Scribe #1: I, Edith Mayorga MD, personally performed the services described in this documentation, as scribed by Tenzin Adam, in my presence, and it is both accurate and complete.       Scribe Attestation:   Scribe #2: I performed the above scribed service and the documentation accurately describes the services I performed. I attest to the accuracy of the note.    Attending Attestation:           Physician Attestation for Scribe:    Physician Attestation Statement for Scribe #2: I, Savannah Singh MD, reviewed documentation, as scribed by Srikanth Sin in my presence, and it is both accurate and complete. I also acknowledge and confirm the content of the note done by Scribe #1.          Clinical  Impression       ICD-10-CM ICD-9-CM   1. Atypical chest pain R07.89 786.59   2. Chest pain R07.9 786.50   3. Chronic sinus bradycardia R00.1 427.81       Disposition:   Disposition: Discharged  Condition: Stable         Savannah Singh MD  04/12/20 0917

## 2020-04-12 NOTE — ED NOTES
Pt reports relief of chest pain & h/a, currently only having pain in lateral right neck rated 4/10. Will continue to monitor.

## 2020-04-12 NOTE — ED NOTES
Radiology called per MD request for ETA on chest xray; states they are waiting for the covid to result. MD and CN notified.

## 2020-04-12 NOTE — ED NOTES
Pt called out to nurse's station with shoulder and neck pain. RN to bs, also with c/o continued chest pain & now h/a after first dose of Nitro.   Dr. Singh notified, see new orders.

## 2020-04-12 NOTE — ED NOTES
Patient identifiers verified and correct for Monte Lashone Weathers.    LOC: The patient is awake, alert and aware of environment with an appropriate affect, the patient is oriented x 3 and speaking appropriately.  APPEARANCE: Patient resting comfortably and in no acute distress, patient is clean and well groomed, patient's clothing is properly fastened.  SKIN: The skin is warm and dry, color consistent with ethnicity, patient has normal skin turgor and moist mucus membranes, skin intact, no breakdown or bruising noted.  MUSCULOSKELETAL: Patient moving all extremities spontaneously. Reports 8/10 pain to mid sternal chest area, right lateral neck/shoulder & headache.   RESPIRATORY: Airway is open and patent, respirations are spontaneous.  CARDIAC: Patient has a normal rate, no periphreal edema noted, capillary refill < 3 seconds.  ABDOMEN: Soft and non tender to palpation.    Will continue to monitor.

## 2021-04-28 ENCOUNTER — PATIENT MESSAGE (OUTPATIENT)
Dept: RESEARCH | Facility: HOSPITAL | Age: 46
End: 2021-04-28

## (undated) DEVICE — SUT MONOCRYL 4.0 PS2 CP496G

## (undated) DEVICE — SYR 10CC LUER LOCK

## (undated) DEVICE — SEE MEDLINE ITEM 157117

## (undated) DEVICE — ADHESIVE DERMABOND ADVANCED

## (undated) DEVICE — SUT VICRYL PLUS 3-0 SH 18IN

## (undated) DEVICE — GAUZE SPONGE 4X4 12PLY

## (undated) DEVICE — GLOVE 7.0 PROTEXIS PI BLUE

## (undated) DEVICE — MANIFOLD 4 PORT

## (undated) DEVICE — GLOVE PROTEXIS HYDROGEL SZ7

## (undated) DEVICE — ELECTRODE REM PLYHSV RETURN 9

## (undated) DEVICE — COVER OVERHEAD SURG LT BLUE

## (undated) DEVICE — SOL 9P NACL IRR PIC IL

## (undated) DEVICE — SEE MEDLINE ITEM 157027

## (undated) DEVICE — DRAPE ABDOMINAL TIBURON 14X11

## (undated) DEVICE — APPLICATOR CHLORAPREP ORN 26ML

## (undated) DEVICE — SEE MEDLINE ITEM 152622

## (undated) DEVICE — SUT MONOCRYL 4-0 PS-2